# Patient Record
Sex: MALE | Race: WHITE | URBAN - METROPOLITAN AREA
[De-identification: names, ages, dates, MRNs, and addresses within clinical notes are randomized per-mention and may not be internally consistent; named-entity substitution may affect disease eponyms.]

---

## 2018-12-11 LAB
AVERAGE GLUCOSE: NORMAL
CHOLESTEROL, TOTAL: 205 MG/DL
CHOLESTEROL/HDL RATIO: 3.2
CREATININE: 0.8 MG/DL
HBA1C MFR BLD: 5.4 %
HDLC SERPL-MCNC: 65 MG/DL (ref 35–70)
LDL CHOLESTEROL CALCULATED: 122 MG/DL (ref 0–160)
POTASSIUM (K+): 4.2
TRIGL SERPL-MCNC: 85 MG/DL
VLDLC SERPL CALC-MCNC: NORMAL MG/DL

## 2019-04-09 VITALS
HEIGHT: 68 IN | SYSTOLIC BLOOD PRESSURE: 120 MMHG | DIASTOLIC BLOOD PRESSURE: 70 MMHG | WEIGHT: 196 LBS | TEMPERATURE: 97.1 F | BODY MASS INDEX: 29.7 KG/M2

## 2019-04-09 RX ORDER — SILDENAFIL CITRATE 20 MG/1
20 TABLET ORAL PRN
COMMUNITY
End: 2019-05-15 | Stop reason: SDUPTHER

## 2019-04-09 RX ORDER — SILDENAFIL 100 MG/1
100 TABLET, FILM COATED ORAL PRN
COMMUNITY
End: 2019-05-15 | Stop reason: SDUPTHER

## 2019-05-09 LAB
ALBUMIN SERPL-MCNC: 4.4 G/DL
ALP BLD-CCNC: 49 U/L
ALT SERPL-CCNC: 29 U/L
ANION GAP SERPL CALCULATED.3IONS-SCNC: NORMAL MMOL/L
AST SERPL-CCNC: 23 U/L
AVERAGE GLUCOSE: NORMAL
BILIRUB SERPL-MCNC: 0.9 MG/DL (ref 0.1–1.4)
BUN BLDV-MCNC: 19 MG/DL
CALCIUM SERPL-MCNC: 9.3 MG/DL
CHLORIDE BLD-SCNC: 105 MMOL/L
CHOLESTEROL, TOTAL: 208 MG/DL
CHOLESTEROL/HDL RATIO: 3.5
CO2: 25 MMOL/L
CREAT SERPL-MCNC: 0.84 MG/DL
GFR CALCULATED: NORMAL
GLUCOSE BLD-MCNC: 143 MG/DL
HBA1C MFR BLD: 5.9 %
HDLC SERPL-MCNC: 60 MG/DL (ref 35–70)
LDL CHOLESTEROL CALCULATED: 124 MG/DL (ref 0–160)
POTASSIUM SERPL-SCNC: 4.1 MMOL/L
SODIUM BLD-SCNC: 141 MMOL/L
TOTAL PROTEIN: 6.7
TRIGL SERPL-MCNC: 125 MG/DL
VLDLC SERPL CALC-MCNC: 149 MG/DL

## 2019-05-15 ENCOUNTER — OFFICE VISIT (OUTPATIENT)
Dept: PRIMARY CARE CLINIC | Age: 74
End: 2019-05-15
Payer: MEDICARE

## 2019-05-15 VITALS
WEIGHT: 200 LBS | HEART RATE: 86 BPM | OXYGEN SATURATION: 95 % | SYSTOLIC BLOOD PRESSURE: 120 MMHG | BODY MASS INDEX: 30.41 KG/M2 | TEMPERATURE: 97.2 F | DIASTOLIC BLOOD PRESSURE: 76 MMHG

## 2019-05-15 DIAGNOSIS — L30.9 ECZEMA, UNSPECIFIED TYPE: ICD-10-CM

## 2019-05-15 DIAGNOSIS — E78.2 MIXED HYPERLIPIDEMIA: Primary | ICD-10-CM

## 2019-05-15 DIAGNOSIS — K21.9 GASTROESOPHAGEAL REFLUX DISEASE WITHOUT ESOPHAGITIS: ICD-10-CM

## 2019-05-15 DIAGNOSIS — N52.9 ERECTILE DYSFUNCTION, UNSPECIFIED ERECTILE DYSFUNCTION TYPE: ICD-10-CM

## 2019-05-15 DIAGNOSIS — J30.1 SEASONAL ALLERGIC RHINITIS DUE TO POLLEN: ICD-10-CM

## 2019-05-15 DIAGNOSIS — R73.01 IMPAIRED FASTING GLUCOSE: ICD-10-CM

## 2019-05-15 PROCEDURE — 99214 OFFICE O/P EST MOD 30 MIN: CPT | Performed by: FAMILY MEDICINE

## 2019-05-15 RX ORDER — SILDENAFIL CITRATE 20 MG/1
20 TABLET ORAL DAILY PRN
Qty: 30 TABLET | Refills: 3 | Status: SHIPPED | OUTPATIENT
Start: 2019-05-15

## 2019-05-15 RX ORDER — PRAVASTATIN SODIUM 20 MG
20 TABLET ORAL DAILY
Qty: 90 TABLET | Refills: 3 | Status: SHIPPED | OUTPATIENT
Start: 2019-05-15 | End: 2019-12-09

## 2019-05-15 RX ORDER — MONTELUKAST SODIUM 10 MG/1
10 TABLET ORAL NIGHTLY
Qty: 90 TABLET | Refills: 3 | Status: SHIPPED
Start: 2019-05-15 | End: 2020-05-18

## 2019-05-15 RX ORDER — CLOBETASOL PROPIONATE 0.05 MG/G
60 GEL TOPICAL 2 TIMES DAILY
Qty: 60 G | Refills: 1 | Status: SHIPPED | OUTPATIENT
Start: 2019-05-15 | End: 2020-01-04

## 2019-05-15 RX ORDER — FAMOTIDINE 20 MG/1
20 TABLET, FILM COATED ORAL DAILY
Qty: 90 TABLET | Refills: 3 | Status: SHIPPED | OUTPATIENT
Start: 2019-05-15 | End: 2019-05-20 | Stop reason: SDUPTHER

## 2019-05-15 RX ORDER — PRAVASTATIN SODIUM 20 MG
20 TABLET ORAL DAILY
COMMUNITY
End: 2019-05-15 | Stop reason: SDUPTHER

## 2019-05-15 ASSESSMENT — PATIENT HEALTH QUESTIONNAIRE - PHQ9
2. FEELING DOWN, DEPRESSED OR HOPELESS: 0
SUM OF ALL RESPONSES TO PHQ QUESTIONS 1-9: 0
SUM OF ALL RESPONSES TO PHQ QUESTIONS 1-9: 0
SUM OF ALL RESPONSES TO PHQ9 QUESTIONS 1 & 2: 0
1. LITTLE INTEREST OR PLEASURE IN DOING THINGS: 0

## 2019-05-15 ASSESSMENT — ENCOUNTER SYMPTOMS
RESPIRATORY NEGATIVE: 1
ALLERGIC/IMMUNOLOGIC NEGATIVE: 1
GASTROINTESTINAL NEGATIVE: 1
EYES NEGATIVE: 1

## 2019-05-15 NOTE — PROGRESS NOTES
5/15/19     Gayle Lizarraga    : 1945 Sex: male   Age: 72 y.o. Chief Complaint   Patient presents with    Discuss Labs    Hyperlipidemia       Prior to Admission medications    Medication Sig Start Date End Date Taking? Authorizing Provider   famotidine (PEPCID) 20 MG tablet Take 1 tablet by mouth daily 5/15/19  Yes Ayde Fernandez DO   montelukast (SINGULAIR) 10 MG tablet Take 1 tablet by mouth nightly 5/15/19  Yes Ayde Fernandez DO   pravastatin (PRAVACHOL) 20 MG tablet Take 1 tablet by mouth daily 5/15/19  Yes Ayde Fernandez DO   clobetasol propionate 0.05 % GEL gel Apply 60 g topically 2 times daily 5/15/19  Yes Ayde Fernandez DO   sildenafil (REVATIO) 20 MG tablet Take 1 tablet by mouth daily as needed (as needed) 5/15/19  Yes Ayde Fernandez DO   fluticasone (FLONASE) 50 MCG/ACT nasal spray 2 sprays by Nasal route daily    Yes Historical Provider, MD   Coenzyme Q10 (CO Q-10) 200 MG CAPS Take 200 mg by mouth daily    Yes Historical Provider, MD   L-Lysine HCl 1000 MG TABS Take 1,000 mg by mouth daily    Yes Historical Provider, MD   Multiple Vitamins-Minerals (THERAPEUTIC MULTIVITAMIN-MINERALS) tablet Take 1 tablet by mouth daily   Yes Historical Provider, MD   Ascorbic Acid (VITAMIN C) 250 MG tablet Take 500 mg by mouth daily   Yes Historical Provider, MD   Saw [place], Serenoa repens, 1000 MG CAPS Take 1,000 mg by mouth daily    Yes Historical Provider, MD          HPI:  Patient presents today and medical follow-up on hyperlipidemia and impaired fasting glucose erectile dysfunction reflux disease seasonal allergies mild eczema. All stable at this time. Recent labs completed stable A1c at 5.9. Cholesterol 208 HDL 60 . ROS:  Review of Systems   Constitutional: Negative. HENT: Negative. Eyes: Negative. Respiratory: Negative. Gastrointestinal: Negative. Endocrine: Negative. Genitourinary: Negative. Musculoskeletal: Negative. Skin: Negative. Allergic/Immunologic: Negative. Neurological: Negative. Hematological: Negative. Psychiatric/Behavioral: Negative.                Current Outpatient Medications:     famotidine (PEPCID) 20 MG tablet, Take 1 tablet by mouth daily, Disp: 90 tablet, Rfl: 3    montelukast (SINGULAIR) 10 MG tablet, Take 1 tablet by mouth nightly, Disp: 90 tablet, Rfl: 3    pravastatin (PRAVACHOL) 20 MG tablet, Take 1 tablet by mouth daily, Disp: 90 tablet, Rfl: 3    clobetasol propionate 0.05 % GEL gel, Apply 60 g topically 2 times daily, Disp: 60 g, Rfl: 1    sildenafil (REVATIO) 20 MG tablet, Take 1 tablet by mouth daily as needed (as needed), Disp: 30 tablet, Rfl: 3    fluticasone (FLONASE) 50 MCG/ACT nasal spray, 2 sprays by Nasal route daily , Disp: , Rfl:     Coenzyme Q10 (CO Q-10) 200 MG CAPS, Take 200 mg by mouth daily , Disp: , Rfl:     L-Lysine HCl 1000 MG TABS, Take 1,000 mg by mouth daily , Disp: , Rfl:     Multiple Vitamins-Minerals (THERAPEUTIC MULTIVITAMIN-MINERALS) tablet, Take 1 tablet by mouth daily, Disp: , Rfl:     Ascorbic Acid (VITAMIN C) 250 MG tablet, Take 500 mg by mouth daily, Disp: , Rfl:     Saw [place], Serenoa repens, 1000 MG CAPS, Take 1,000 mg by mouth daily , Disp: , Rfl:     Allergies   Allergen Reactions    Atorvastatin     Flomax [Tamsulosin Hcl]     Penicillins     Silodosin        Social History     Tobacco Use    Smoking status: Never Smoker    Smokeless tobacco: Never Used   Substance Use Topics    Alcohol use: Yes     Comment: social    Drug use: No      Past Surgical History:   Procedure Laterality Date    KNEE SURGERY Right ~6/2015    TENDON RELEASE  2005    right hand     Family History   Problem Relation Age of Onset    Diabetes Mother     Heart Disease Mother     Stroke Mother     Other Brother      Past Medical History:   Diagnosis Date    Hyperlipidemia     Pleurisy     Vertigo        Vitals:    05/15/19 1128   BP: 120/76   Pulse: 86   Temp: 97.2 °F (36.2 °C)   TempSrc: Temporal   SpO2: 95%   Weight: 200 lb (90.7 kg)     BP Readings from Last 3 Encounters:   05/15/19 120/76   02/11/19 120/70   09/21/16 137/75        Physical Exam   Constitutional: He is oriented to person, place, and time. He appears well-developed and well-nourished. HENT:   Head: Normocephalic. Right Ear: External ear normal.   Left Ear: External ear normal.   Nose: Nose normal.   Mouth/Throat: Oropharynx is clear and moist.   Eyes: Pupils are equal, round, and reactive to light. Conjunctivae and EOM are normal.   Neck: Normal range of motion. Neck supple. Cardiovascular: Normal rate and intact distal pulses. Pulmonary/Chest: Breath sounds normal.   Abdominal: Soft. Bowel sounds are normal.   Musculoskeletal: Normal range of motion. Neurological: He is alert and oriented to person, place, and time. Skin: Skin is warm and dry. Psychiatric: He has a normal mood and affect. His behavior is normal.   Nursing note and vitals reviewed. Physical exam findings are all stable as noted. Mild eczema involving low back. Refilled prescription for clobetasol cream previously prescribed by Dr. Halina Galdamez. Refill provided on sildenafil. Labs to be completed prior to next visit in 6 months complete physical.                   Plan Per Assessment:  Pat Beasley was seen today for discuss labs and hyperlipidemia. Diagnoses and all orders for this visit:    Mixed hyperlipidemia  -     CBC Auto Differential; Future  -     Comprehensive Metabolic Panel; Future  -     Lipid Panel; Future  -     T4; Future  -     TSH without Reflex; Future  -     Psa screening; Future    Impaired fasting glucose  -     CBC Auto Differential; Future  -     Comprehensive Metabolic Panel; Future  -     Lipid Panel; Future  -     T4; Future  -     TSH without Reflex; Future  -     Psa screening;  Future    Seasonal allergic rhinitis due to pollen    Erectile dysfunction, unspecified erectile dysfunction type  -     CBC Auto Differential; Future  -     Comprehensive Metabolic Panel; Future  -     Lipid Panel; Future  -     T4; Future  -     TSH without Reflex; Future  -     Psa screening; Future    Gastroesophageal reflux disease without esophagitis  -     CBC Auto Differential; Future  -     Comprehensive Metabolic Panel; Future  -     Lipid Panel; Future  -     T4; Future  -     TSH without Reflex; Future  -     Psa screening; Future    Eczema, unspecified type    Other orders  -     famotidine (PEPCID) 20 MG tablet; Take 1 tablet by mouth daily  -     montelukast (SINGULAIR) 10 MG tablet; Take 1 tablet by mouth nightly  -     pravastatin (PRAVACHOL) 20 MG tablet; Take 1 tablet by mouth daily  -     clobetasol propionate 0.05 % GEL gel; Apply 60 g topically 2 times daily  -     sildenafil (REVATIO) 20 MG tablet; Take 1 tablet by mouth daily as needed (as needed)            Return in about 6 months (around 11/15/2019) for carlos Badillo DO    Note was generated with the assistance of voice recognition software. Document was reviewed however may contain grammatical errors.

## 2019-05-21 RX ORDER — FAMOTIDINE 20 MG/1
TABLET, FILM COATED ORAL
Qty: 90 TABLET | Refills: 3 | Status: SHIPPED
Start: 2019-05-21 | End: 2020-05-29 | Stop reason: SDUPTHER

## 2019-11-06 RX ORDER — VITAMIN E 268 MG
400 CAPSULE ORAL DAILY
COMMUNITY

## 2019-11-06 RX ORDER — PRAVASTATIN SODIUM 40 MG
40 TABLET ORAL DAILY
COMMUNITY
End: 2019-12-09 | Stop reason: SDUPTHER

## 2019-11-06 RX ORDER — SILDENAFIL CITRATE 20 MG/1
20 TABLET ORAL PRN
COMMUNITY
End: 2020-01-04

## 2019-11-06 RX ORDER — SILDENAFIL 100 MG/1
100 TABLET, FILM COATED ORAL PRN
COMMUNITY
End: 2022-03-10

## 2019-12-02 LAB
ALBUMIN SERPL-MCNC: 4.6 G/DL
ALP BLD-CCNC: 50 U/L
ALT SERPL-CCNC: 38 U/L
ANION GAP SERPL CALCULATED.3IONS-SCNC: NORMAL MMOL/L
AST SERPL-CCNC: 28 U/L
BASOPHILS ABSOLUTE: 42 /ΜL
BASOPHILS RELATIVE PERCENT: 0.8 %
BILIRUB SERPL-MCNC: 0.8 MG/DL (ref 0.1–1.4)
BUN BLDV-MCNC: 15 MG/DL
CALCIUM SERPL-MCNC: 9.5 MG/DL
CHLORIDE BLD-SCNC: 102 MMOL/L
CHOLESTEROL, TOTAL: 239 MG/DL
CHOLESTEROL/HDL RATIO: 4.3
CO2: 25 MMOL/L
CREAT SERPL-MCNC: 0.86 MG/DL
EOSINOPHILS ABSOLUTE: 159 /ΜL
EOSINOPHILS RELATIVE PERCENT: 3 %
GFR CALCULATED: NORMAL
GLUCOSE BLD-MCNC: 127 MG/DL
HCT VFR BLD CALC: 44.3 % (ref 41–53)
HDLC SERPL-MCNC: 55 MG/DL (ref 35–70)
HEMOGLOBIN: 15.7 G/DL (ref 13.5–17.5)
LDL CHOLESTEROL CALCULATED: 152 MG/DL (ref 0–160)
LYMPHOCYTES ABSOLUTE: 2184 /ΜL
LYMPHOCYTES RELATIVE PERCENT: 41.2 %
MCH RBC QN AUTO: 32.8 PG
MCHC RBC AUTO-ENTMCNC: 35.4 G/DL
MCV RBC AUTO: 92.7 FL
MONOCYTES ABSOLUTE: 790 /ΜL
MONOCYTES RELATIVE PERCENT: 14.9 %
NEUTROPHILS ABSOLUTE: 2125 /ΜL
NEUTROPHILS RELATIVE PERCENT: 40.1 %
PDW BLD-RTO: 11.9 %
PLATELET # BLD: 155 K/ΜL
PMV BLD AUTO: 10.8 FL
POTASSIUM SERPL-SCNC: 3.8 MMOL/L
PROSTATE SPECIFIC ANTIGEN: 0.8 NG/ML
RBC # BLD: 4.78 10^6/ΜL
SODIUM BLD-SCNC: 139 MMOL/L
T4 TOTAL: 6.8
TOTAL PROTEIN: 6.9
TRIGL SERPL-MCNC: 182 MG/DL
TSH SERPL DL<=0.05 MIU/L-ACNC: 2.08 UIU/ML
VLDLC SERPL CALC-MCNC: NORMAL MG/DL
WBC # BLD: 5.3 10^3/ML

## 2019-12-03 DIAGNOSIS — N52.9 ERECTILE DYSFUNCTION, UNSPECIFIED ERECTILE DYSFUNCTION TYPE: ICD-10-CM

## 2019-12-03 DIAGNOSIS — R73.01 IMPAIRED FASTING GLUCOSE: ICD-10-CM

## 2019-12-03 DIAGNOSIS — K21.9 GASTROESOPHAGEAL REFLUX DISEASE WITHOUT ESOPHAGITIS: ICD-10-CM

## 2019-12-03 DIAGNOSIS — E78.2 MIXED HYPERLIPIDEMIA: ICD-10-CM

## 2019-12-09 ENCOUNTER — OFFICE VISIT (OUTPATIENT)
Dept: PRIMARY CARE CLINIC | Age: 74
End: 2019-12-09
Payer: MEDICARE

## 2019-12-09 VITALS
SYSTOLIC BLOOD PRESSURE: 124 MMHG | WEIGHT: 208.6 LBS | OXYGEN SATURATION: 95 % | HEART RATE: 81 BPM | RESPIRATION RATE: 16 BRPM | TEMPERATURE: 98 F | DIASTOLIC BLOOD PRESSURE: 62 MMHG | BODY MASS INDEX: 31.72 KG/M2

## 2019-12-09 DIAGNOSIS — D22.9 BENIGN SKIN MOLE: ICD-10-CM

## 2019-12-09 DIAGNOSIS — E78.2 MIXED HYPERLIPIDEMIA: Chronic | ICD-10-CM

## 2019-12-09 DIAGNOSIS — Z00.00 ANNUAL PHYSICAL EXAM: Primary | ICD-10-CM

## 2019-12-09 DIAGNOSIS — R07.0 THROAT PAIN: ICD-10-CM

## 2019-12-09 DIAGNOSIS — R31.1 BENIGN ESSENTIAL MICROSCOPIC HEMATURIA: ICD-10-CM

## 2019-12-09 DIAGNOSIS — G47.33 OSA (OBSTRUCTIVE SLEEP APNEA): ICD-10-CM

## 2019-12-09 LAB
BILIRUBIN, POC: NORMAL
BLOOD URINE, POC: NORMAL
CLARITY, POC: CLEAR
COLOR, POC: YELLOW
GLUCOSE URINE, POC: NORMAL
KETONES, POC: NORMAL
LEUKOCYTE EST, POC: NORMAL
NITRITE, POC: NORMAL
PH, POC: 7
PROTEIN, POC: NORMAL
SPECIFIC GRAVITY, POC: 1.01
UROBILINOGEN, POC: 0.2

## 2019-12-09 PROCEDURE — 99397 PER PM REEVAL EST PAT 65+ YR: CPT | Performed by: FAMILY MEDICINE

## 2019-12-09 PROCEDURE — 93000 ELECTROCARDIOGRAM COMPLETE: CPT | Performed by: FAMILY MEDICINE

## 2019-12-09 RX ORDER — PRAVASTATIN SODIUM 40 MG
40 TABLET ORAL DAILY
Qty: 90 TABLET | Refills: 3 | Status: SHIPPED
Start: 2019-12-09 | End: 2020-06-15 | Stop reason: SDUPTHER

## 2019-12-09 RX ORDER — CLOBETASOL PROPIONATE 0.05 G/100ML
SHAMPOO TOPICAL
Qty: 118 ML | Refills: 1 | Status: SHIPPED | OUTPATIENT
Start: 2019-12-09

## 2019-12-09 RX ORDER — SCOLOPAMINE TRANSDERMAL SYSTEM 1 MG/1
1 PATCH, EXTENDED RELEASE TRANSDERMAL
Qty: 10 PATCH | Refills: 11 | Status: SHIPPED
Start: 2019-12-09 | End: 2020-06-15 | Stop reason: CLARIF

## 2019-12-09 ASSESSMENT — ENCOUNTER SYMPTOMS
EYES NEGATIVE: 1
RESPIRATORY NEGATIVE: 1
GASTROINTESTINAL NEGATIVE: 1
ALLERGIC/IMMUNOLOGIC NEGATIVE: 1

## 2019-12-20 ENCOUNTER — OFFICE VISIT (OUTPATIENT)
Dept: FAMILY MEDICINE CLINIC | Age: 74
End: 2019-12-20
Payer: MEDICARE

## 2019-12-20 VITALS
TEMPERATURE: 97.4 F | DIASTOLIC BLOOD PRESSURE: 68 MMHG | HEART RATE: 93 BPM | OXYGEN SATURATION: 96 % | SYSTOLIC BLOOD PRESSURE: 124 MMHG

## 2019-12-20 DIAGNOSIS — R09.82 POSTNASAL DRIP: ICD-10-CM

## 2019-12-20 DIAGNOSIS — R07.0 PAIN IN THROAT: ICD-10-CM

## 2019-12-20 DIAGNOSIS — J01.90 ACUTE NON-RECURRENT SINUSITIS, UNSPECIFIED LOCATION: Primary | ICD-10-CM

## 2019-12-20 DIAGNOSIS — R51.9 SINUS HEADACHE: ICD-10-CM

## 2019-12-20 PROCEDURE — 99213 OFFICE O/P EST LOW 20 MIN: CPT | Performed by: PHYSICIAN ASSISTANT

## 2019-12-20 RX ORDER — DOXYCYCLINE HYCLATE 100 MG
100 TABLET ORAL 2 TIMES DAILY
Qty: 20 TABLET | Refills: 0 | Status: SHIPPED | OUTPATIENT
Start: 2019-12-20 | End: 2019-12-30

## 2020-01-04 ENCOUNTER — OFFICE VISIT (OUTPATIENT)
Dept: FAMILY MEDICINE CLINIC | Age: 75
End: 2020-01-04
Payer: MEDICARE

## 2020-01-04 VITALS
HEART RATE: 72 BPM | HEIGHT: 69 IN | BODY MASS INDEX: 30.66 KG/M2 | DIASTOLIC BLOOD PRESSURE: 84 MMHG | OXYGEN SATURATION: 95 % | TEMPERATURE: 97.2 F | RESPIRATION RATE: 18 BRPM | WEIGHT: 207 LBS | SYSTOLIC BLOOD PRESSURE: 120 MMHG

## 2020-01-04 PROBLEM — J02.9 SORE THROAT: Status: ACTIVE | Noted: 2019-12-09

## 2020-01-04 LAB — S PYO AG THROAT QL: NORMAL

## 2020-01-04 PROCEDURE — 87880 STREP A ASSAY W/OPTIC: CPT | Performed by: FAMILY MEDICINE

## 2020-01-04 PROCEDURE — 99213 OFFICE O/P EST LOW 20 MIN: CPT | Performed by: FAMILY MEDICINE

## 2020-01-04 RX ORDER — AZITHROMYCIN 250 MG/1
250 TABLET, FILM COATED ORAL SEE ADMIN INSTRUCTIONS
Qty: 6 TABLET | Refills: 0 | Status: SHIPPED | OUTPATIENT
Start: 2020-01-04 | End: 2020-01-09

## 2020-01-04 RX ORDER — LIDOCAINE HYDROCHLORIDE 20 MG/ML
15 SOLUTION OROPHARYNGEAL
Qty: 100 ML | Refills: 0 | Status: SHIPPED
Start: 2020-01-04 | End: 2020-06-15 | Stop reason: CLARIF

## 2020-01-04 RX ORDER — PREDNISONE 10 MG/1
10 TABLET ORAL 2 TIMES DAILY
Qty: 10 TABLET | Refills: 0 | Status: SHIPPED | OUTPATIENT
Start: 2020-01-04 | End: 2020-01-09

## 2020-01-04 ASSESSMENT — ENCOUNTER SYMPTOMS
SINUS COMPLAINT: 1
SINUS PRESSURE: 1
SORE THROAT: 1
EYES NEGATIVE: 1
COUGH: 1
GASTROINTESTINAL NEGATIVE: 1
SWOLLEN GLANDS: 1

## 2020-01-04 NOTE — PROGRESS NOTES
MG tablet Take 1 tablet by mouth daily Take 1 by mouth daily with evening meal Yes Hector Fernandez DO   scopolamine (TRANSDERM-SCOP, 1.5 MG,) transdermal patch Place 1 patch onto the skin every 72 hours Yes Hector Fernandez DO   Clobetasol Propionate 0.05 % SHAM Qd prn Yes Hector Fernandez DO   sildenafil (VIAGRA) 100 MG tablet Take 100 mg by mouth as needed for Erectile Dysfunction (Take 1 everyday by mouth as needed) Yes Historical Provider, MD   vitamin E 400 UNIT capsule Take 400 Units by mouth daily Yes Historical Provider, MD   aspirin 81 MG tablet Take 81 mg by mouth daily Yes Historical Provider, MD   famotidine (PEPCID) 20 MG tablet TAKE 1 TABLET DAILY Yes Hector Fernandez DO   montelukast (SINGULAIR) 10 MG tablet Take 1 tablet by mouth nightly Yes Hector Fernandez DO   sildenafil (REVATIO) 20 MG tablet Take 1 tablet by mouth daily as needed (as needed) Yes Hector Fernandez DO   fluticasone (FLONASE) 50 MCG/ACT nasal spray 2 sprays by Nasal route daily  Yes Historical Provider, MD   Coenzyme Q10 (CO Q-10) 200 MG CAPS Take 200 mg by mouth daily  Yes Historical Provider, MD   L-Lysine HCl 1000 MG TABS Take 1,000 mg by mouth daily  Yes Historical Provider, MD   Multiple Vitamins-Minerals (THERAPEUTIC MULTIVITAMIN-MINERALS) tablet Take 1 tablet by mouth daily Yes Historical Provider, MD   Ascorbic Acid (VITAMIN C) 250 MG tablet Take 500 mg by mouth daily Yes Historical Provider, MD   Saw [place], Serenoa repens, 1000 MG CAPS Take 1,000 mg by mouth daily  Yes Historical Provider, MD        Social History     Tobacco Use    Smoking status: Never Smoker    Smokeless tobacco: Never Used   Substance Use Topics    Alcohol use: Yes     Comment: social        Vitals:    01/04/20 1320   BP: 120/84   Pulse: 72   Resp: 18   Temp: 97.2 °F (36.2 °C)   SpO2: 95%   Weight: 207 lb (93.9 kg)   Height: 5' 8.5\" (1.74 m)     Estimated body mass index is 31.02 kg/m² as calculated from the following:    Height as of

## 2020-01-08 PROBLEM — Z00.00 ANNUAL PHYSICAL EXAM: Status: RESOLVED | Noted: 2019-12-09 | Resolved: 2020-01-08

## 2020-02-03 PROBLEM — J02.9 SORE THROAT: Status: RESOLVED | Noted: 2019-12-09 | Resolved: 2020-02-03

## 2020-05-18 RX ORDER — MONTELUKAST SODIUM 10 MG/1
TABLET ORAL
Qty: 90 TABLET | Refills: 3 | Status: SHIPPED
Start: 2020-05-18 | End: 2021-05-12

## 2020-05-29 RX ORDER — FAMOTIDINE 20 MG/1
TABLET, FILM COATED ORAL
Qty: 90 TABLET | Refills: 3 | Status: SHIPPED
Start: 2020-05-29 | End: 2020-06-01

## 2020-06-01 RX ORDER — FAMOTIDINE 20 MG/1
TABLET, FILM COATED ORAL
Qty: 90 TABLET | Refills: 3 | Status: SHIPPED
Start: 2020-06-01 | End: 2021-05-27

## 2020-06-05 LAB
ALBUMIN SERPL-MCNC: NORMAL G/DL
ALP BLD-CCNC: NORMAL U/L
ALT SERPL-CCNC: NORMAL U/L
ANION GAP SERPL CALCULATED.3IONS-SCNC: NORMAL MMOL/L
AST SERPL-CCNC: NORMAL U/L
BILIRUB SERPL-MCNC: NORMAL MG/DL
BUN BLDV-MCNC: 15 MG/DL
CALCIUM SERPL-MCNC: NORMAL MG/DL
CHLORIDE BLD-SCNC: NORMAL MMOL/L
CHOLESTEROL, TOTAL: 248 MG/DL
CHOLESTEROL/HDL RATIO: 4.5
CO2: NORMAL
CREAT SERPL-MCNC: 0.77 MG/DL
GFR CALCULATED: NORMAL
GLUCOSE BLD-MCNC: 145 MG/DL
HDLC SERPL-MCNC: 55 MG/DL (ref 35–70)
LDL CHOLESTEROL CALCULATED: 162 MG/DL (ref 0–160)
POTASSIUM SERPL-SCNC: 4.3 MMOL/L
SODIUM BLD-SCNC: NORMAL MMOL/L
TOTAL CK: 49 U/L
TOTAL PROTEIN: NORMAL
TRIGL SERPL-MCNC: 160 MG/DL
VLDLC SERPL CALC-MCNC: ABNORMAL MG/DL

## 2020-06-15 ENCOUNTER — OFFICE VISIT (OUTPATIENT)
Dept: PRIMARY CARE CLINIC | Age: 75
End: 2020-06-15
Payer: MEDICARE

## 2020-06-15 VITALS
DIASTOLIC BLOOD PRESSURE: 76 MMHG | BODY MASS INDEX: 30.57 KG/M2 | SYSTOLIC BLOOD PRESSURE: 140 MMHG | HEART RATE: 90 BPM | OXYGEN SATURATION: 96 % | WEIGHT: 204 LBS

## 2020-06-15 PROBLEM — M79.18 RHOMBOID MUSCLE PAIN: Status: ACTIVE | Noted: 2020-06-15

## 2020-06-15 PROBLEM — T14.8XXA ABRASION: Status: ACTIVE | Noted: 2020-06-15

## 2020-06-15 PROCEDURE — 99214 OFFICE O/P EST MOD 30 MIN: CPT | Performed by: FAMILY MEDICINE

## 2020-06-15 RX ORDER — PRAVASTATIN SODIUM 40 MG
20 TABLET ORAL DAILY
Qty: 90 TABLET | Refills: 3 | Status: SHIPPED
Start: 2020-06-15 | End: 2020-06-15

## 2020-06-15 RX ORDER — FLUTICASONE PROPIONATE 50 MCG
2 SPRAY, SUSPENSION (ML) NASAL DAILY
Qty: 3 BOTTLE | Refills: 3 | Status: SHIPPED
Start: 2020-06-15 | End: 2021-05-06

## 2020-06-15 RX ORDER — PRAVASTATIN SODIUM 40 MG
TABLET ORAL
Qty: 90 TABLET | Refills: 3 | Status: SHIPPED
Start: 2020-06-15 | End: 2021-06-04 | Stop reason: SDUPTHER

## 2020-06-15 ASSESSMENT — PATIENT HEALTH QUESTIONNAIRE - PHQ9
SUM OF ALL RESPONSES TO PHQ QUESTIONS 1-9: 0
SUM OF ALL RESPONSES TO PHQ QUESTIONS 1-9: 0
SUM OF ALL RESPONSES TO PHQ9 QUESTIONS 1 & 2: 0
2. FEELING DOWN, DEPRESSED OR HOPELESS: 0
1. LITTLE INTEREST OR PLEASURE IN DOING THINGS: 0

## 2020-06-15 ASSESSMENT — ENCOUNTER SYMPTOMS
BACK PAIN: 1
EYES NEGATIVE: 1
ALLERGIC/IMMUNOLOGIC NEGATIVE: 1
GASTROINTESTINAL NEGATIVE: 1
RESPIRATORY NEGATIVE: 1

## 2020-06-15 NOTE — PROGRESS NOTES
6/15/20     Shira Espinosa    : 1945 Sex: male   Age: 69 y.o. Chief Complaint   Patient presents with    Discuss Labs    Hyperlipidemia    Back Pain     re injured back working in the yard       Prior to Admission medications    Medication Sig Start Date End Date Taking? Authorizing Provider   fluticasone (FLONASE) 50 MCG/ACT nasal spray 2 sprays by Nasal route daily 6/15/20  Yes Kathleen Fernandez, DO   mupirocin (BACTROBAN) 2 % ointment Apply 3 times daily. 6/15/20  Yes Kathleen Fernandez, DO   pravastatin (PRAVACHOL) 40 MG tablet 1 qd 6/15/20  Yes Kathleen Fernandez, DO   famotidine (PEPCID) 20 MG tablet TAKE 1 TABLET DAILY 20  Yes Kathleen Fernandez, DO   montelukast (SINGULAIR) 10 MG tablet TAKE 1 TABLET NIGHTLY 20  Yes Kathleen Fernandez, DO   Clobetasol Propionate 0.05 % SHAM Qd prn 19  Yes Kathleen Fernandez, DO   sildenafil (VIAGRA) 100 MG tablet Take 100 mg by mouth as needed for Erectile Dysfunction (Take 1 everyday by mouth as needed)   Yes Historical Provider, MD   vitamin E 400 UNIT capsule Take 400 Units by mouth daily   Yes Historical Provider, MD   aspirin 81 MG tablet Take 81 mg by mouth daily   Yes Historical Provider, MD   sildenafil (REVATIO) 20 MG tablet Take 1 tablet by mouth daily as needed (as needed) 5/15/19  Yes Kathleen Fernandez, DO   Coenzyme Q10 (CO Q-10) 200 MG CAPS Take 200 mg by mouth daily    Yes Historical Provider, MD   L-Lysine HCl 1000 MG TABS Take 1,000 mg by mouth daily    Yes Historical Provider, MD   Multiple Vitamins-Minerals (THERAPEUTIC MULTIVITAMIN-MINERALS) tablet Take 1 tablet by mouth daily   Yes Historical Provider, MD   Ascorbic Acid (VITAMIN C) 250 MG tablet Take 500 mg by mouth daily   Yes Historical Provider, MD   Saw [place], Serenoa repens, 1000 MG CAPS Take 1,000 mg by mouth daily    Yes Historical Provider, MD          HPI: Patient evaluated today hyperlipidemia reflux disease impaired fasting glucose.   Medically managing fairly well with present medications. Rhomboid spasm on the right encourage moist heat ibuprofen and if persistent to follow-up. Issues of skin sore left lower back. Recommended some Bactroban ointment and if persistent reassess. Review of Systems   Constitutional: Negative. HENT: Negative. Eyes: Negative. Respiratory: Negative. Gastrointestinal: Negative. Endocrine: Negative. Genitourinary: Negative. Musculoskeletal: Positive for back pain and myalgias. Skin: Negative. Allergic/Immunologic: Negative. Neurological: Negative. Hematological: Negative. Psychiatric/Behavioral: Negative. Systems review overall stable aside from muscular discomfort right rhomboid. Current Outpatient Medications:     fluticasone (FLONASE) 50 MCG/ACT nasal spray, 2 sprays by Nasal route daily, Disp: 3 Bottle, Rfl: 3    mupirocin (BACTROBAN) 2 % ointment, Apply 3 times daily. , Disp: 30 g, Rfl: 0    pravastatin (PRAVACHOL) 40 MG tablet, 1 qd, Disp: 90 tablet, Rfl: 3    famotidine (PEPCID) 20 MG tablet, TAKE 1 TABLET DAILY, Disp: 90 tablet, Rfl: 3    montelukast (SINGULAIR) 10 MG tablet, TAKE 1 TABLET NIGHTLY, Disp: 90 tablet, Rfl: 3    Clobetasol Propionate 0.05 % SHAM, Qd prn, Disp: 118 mL, Rfl: 1    sildenafil (VIAGRA) 100 MG tablet, Take 100 mg by mouth as needed for Erectile Dysfunction (Take 1 everyday by mouth as needed), Disp: , Rfl:     vitamin E 400 UNIT capsule, Take 400 Units by mouth daily, Disp: , Rfl:     aspirin 81 MG tablet, Take 81 mg by mouth daily, Disp: , Rfl:     sildenafil (REVATIO) 20 MG tablet, Take 1 tablet by mouth daily as needed (as needed), Disp: 30 tablet, Rfl: 3    Coenzyme Q10 (CO Q-10) 200 MG CAPS, Take 200 mg by mouth daily , Disp: , Rfl:     L-Lysine HCl 1000 MG TABS, Take 1,000 mg by mouth daily , Disp: , Rfl:     Multiple Vitamins-Minerals (THERAPEUTIC MULTIVITAMIN-MINERALS) tablet, Take 1 tablet by mouth daily, Disp: , Rfl:     Ascorbic Acid (VITAMIN C) 250 MG tablet, Take 500 mg by mouth daily, Disp: , Rfl:     Saw [place], Serenoa repens, 1000 MG CAPS, Take 1,000 mg by mouth daily , Disp: , Rfl:     Allergies   Allergen Reactions    Atorvastatin     Flomax [Tamsulosin Hcl]     Penicillins     Silodosin        Social History     Tobacco Use    Smoking status: Never Smoker    Smokeless tobacco: Never Used   Substance Use Topics    Alcohol use: Yes     Comment: social    Drug use: No      Past Surgical History:   Procedure Laterality Date    COLONOSCOPY      KNEE SURGERY Right ~6/2015    TENDON RELEASE Right 2005    right hand    VARICOCELECTOMY  1980s    abd. Family History   Problem Relation Age of Onset    Diabetes Mother     Heart Disease Mother     Stroke Mother     Other Brother      Past Medical History:   Diagnosis Date    Hyperlipidemia     Pleurisy     Vertigo 06/2008       Vitals:    06/15/20 1032   BP: (!) 140/76   Pulse: 90   SpO2: 96%   Weight: 204 lb (92.5 kg)     BP Readings from Last 3 Encounters:   06/15/20 (!) 140/76   01/04/20 120/84   12/20/19 124/68        Physical Exam  Vitals signs and nursing note reviewed. Constitutional:       Appearance: He is well-developed. HENT:      Head: Normocephalic. Right Ear: External ear normal.      Left Ear: External ear normal.      Nose: Nose normal.   Eyes:      Conjunctiva/sclera: Conjunctivae normal.      Pupils: Pupils are equal, round, and reactive to light. Neck:      Musculoskeletal: Normal range of motion and neck supple. Cardiovascular:      Rate and Rhythm: Normal rate. Pulmonary:      Breath sounds: Normal breath sounds. Abdominal:      General: Bowel sounds are normal.      Palpations: Abdomen is soft. Musculoskeletal: Normal range of motion. Skin:     General: Skin is warm and dry. Neurological:      Mental Status: He is alert and oriented to person, place, and time.    Psychiatric:         Behavior: Behavior normal.        Today's vitals physical examination stable. Medications to continue as prescribed. Cholesterol 248 HDL 55  glucose at 145 with an A1c at 6.1. Recommended diet exercise current medications and care. Pravastatin adjusted to 40 mg a day. Continue encourage tighter control on sugars. Reassessment 6 months and sooner if problems. Plan Per Assessment:  Margaret Washington was seen today for discuss labs, hyperlipidemia and back pain. Diagnoses and all orders for this visit:    Mixed hyperlipidemia  -     Comprehensive Metabolic Panel; Future  -     Hemoglobin A1C; Future  -     CBC Auto Differential; Future  -     Lipid Panel; Future  -     T4; Future  -     TSH without Reflex; Future  -     Psa screening; Future    Gastroesophageal reflux disease without esophagitis  -     Comprehensive Metabolic Panel; Future  -     Hemoglobin A1C; Future  -     CBC Auto Differential; Future  -     Lipid Panel; Future  -     T4; Future  -     TSH without Reflex; Future  -     Psa screening; Future    Impaired fasting glucose  -     Comprehensive Metabolic Panel; Future  -     Hemoglobin A1C; Future  -     CBC Auto Differential; Future  -     Lipid Panel; Future  -     T4; Future  -     TSH without Reflex; Future  -     Psa screening; Future    Rhomboid muscle pain  -     Comprehensive Metabolic Panel; Future  -     Hemoglobin A1C; Future  -     CBC Auto Differential; Future  -     Lipid Panel; Future  -     T4; Future  -     TSH without Reflex; Future  -     Psa screening; Future    Abrasion    Other orders  -     Discontinue: pravastatin (PRAVACHOL) 40 MG tablet; Take 0.5 tablets by mouth daily Take 1 by mouth daily with evening meal  -     fluticasone (FLONASE) 50 MCG/ACT nasal spray; 2 sprays by Nasal route daily  -     mupirocin (BACTROBAN) 2 % ointment; Apply 3 times daily. -     pravastatin (PRAVACHOL) 40 MG tablet; 1 qd            No follow-ups on file.       Evan Ya DO    Note was generated with the assistance of

## 2021-05-06 RX ORDER — FLUTICASONE PROPIONATE 50 MCG
SPRAY, SUSPENSION (ML) NASAL
Qty: 48 G | Refills: 3 | Status: SHIPPED
Start: 2021-05-06 | End: 2022-03-10 | Stop reason: SDUPTHER

## 2021-05-12 RX ORDER — MONTELUKAST SODIUM 10 MG/1
TABLET ORAL
Qty: 90 TABLET | Refills: 3 | Status: SHIPPED
Start: 2021-05-12 | End: 2022-03-10 | Stop reason: SDUPTHER

## 2021-05-22 LAB
ALBUMIN SERPL-MCNC: 4.4 G/DL
ALP BLD-CCNC: 45 U/L
ALT SERPL-CCNC: 39 U/L
ANION GAP SERPL CALCULATED.3IONS-SCNC: NORMAL MMOL/L
AST SERPL-CCNC: 27 U/L
AVERAGE GLUCOSE: NORMAL
BASOPHILS ABSOLUTE: 42 /ΜL
BASOPHILS RELATIVE PERCENT: 0.8 %
BILIRUB SERPL-MCNC: 0.7 MG/DL (ref 0.1–1.4)
BUN BLDV-MCNC: 19 MG/DL
CALCIUM SERPL-MCNC: 9.1 MG/DL
CHLORIDE BLD-SCNC: 106 MMOL/L
CHOLESTEROL, TOTAL: 208 MG/DL
CHOLESTEROL/HDL RATIO: 3.5
CO2: 27 MMOL/L
CREAT SERPL-MCNC: 0.86 MG/DL
EOSINOPHILS ABSOLUTE: 182 /ΜL
EOSINOPHILS RELATIVE PERCENT: 3.5 %
GFR CALCULATED: 86
GLUCOSE BLD-MCNC: 162 MG/DL
HBA1C MFR BLD: 6.5 %
HCT VFR BLD CALC: 42 % (ref 41–53)
HDLC SERPL-MCNC: 59 MG/DL (ref 35–70)
HEMOGLOBIN: 14.8 G/DL (ref 13.5–17.5)
LDL CHOLESTEROL CALCULATED: 126 MG/DL (ref 0–160)
LYMPHOCYTES ABSOLUTE: 2361 /ΜL
LYMPHOCYTES RELATIVE PERCENT: 45.4 %
MCH RBC QN AUTO: 33.2 PG
MCHC RBC AUTO-ENTMCNC: 35.2 G/DL
MCV RBC AUTO: 94.2 FL
MONOCYTES ABSOLUTE: 712 /ΜL
MONOCYTES RELATIVE PERCENT: 13.7 %
NEUTROPHILS ABSOLUTE: 1903 /ΜL
NEUTROPHILS RELATIVE PERCENT: 36.6 %
NONHDLC SERPL-MCNC: 149 MG/DL
PDW BLD-RTO: 12.1 %
PLATELET # BLD: 135 K/ΜL
PMV BLD AUTO: 10.8 FL
POTASSIUM SERPL-SCNC: 4.6 MMOL/L
PROSTATE SPECIFIC ANTIGEN: 0.8 NG/ML
RBC # BLD: 4.46 10^6/ΜL
SODIUM BLD-SCNC: 141 MMOL/L
T4 TOTAL: 5.8
TOTAL PROTEIN: 6.7
TRIGL SERPL-MCNC: 123 MG/DL
TSH SERPL DL<=0.05 MIU/L-ACNC: 1.26 UIU/ML
VLDLC SERPL CALC-MCNC: NORMAL MG/DL
WBC # BLD: 5.2 10^3/ML

## 2021-05-25 DIAGNOSIS — K21.9 GASTROESOPHAGEAL REFLUX DISEASE WITHOUT ESOPHAGITIS: Chronic | ICD-10-CM

## 2021-05-25 DIAGNOSIS — R73.01 IMPAIRED FASTING GLUCOSE: ICD-10-CM

## 2021-05-25 DIAGNOSIS — E78.2 MIXED HYPERLIPIDEMIA: Chronic | ICD-10-CM

## 2021-05-25 DIAGNOSIS — M79.18 RHOMBOID MUSCLE PAIN: ICD-10-CM

## 2021-05-27 RX ORDER — FAMOTIDINE 20 MG/1
TABLET, FILM COATED ORAL
Qty: 90 TABLET | Refills: 3 | Status: SHIPPED
Start: 2021-05-27 | End: 2022-03-10 | Stop reason: SDUPTHER

## 2021-06-04 ENCOUNTER — OFFICE VISIT (OUTPATIENT)
Dept: PRIMARY CARE CLINIC | Age: 76
End: 2021-06-04
Payer: MEDICARE

## 2021-06-04 VITALS
OXYGEN SATURATION: 96 % | WEIGHT: 198 LBS | HEIGHT: 69 IN | SYSTOLIC BLOOD PRESSURE: 126 MMHG | BODY MASS INDEX: 29.33 KG/M2 | TEMPERATURE: 97.6 F | DIASTOLIC BLOOD PRESSURE: 84 MMHG | HEART RATE: 88 BPM

## 2021-06-04 DIAGNOSIS — E11.9 TYPE 2 DIABETES MELLITUS WITHOUT COMPLICATION, WITHOUT LONG-TERM CURRENT USE OF INSULIN (HCC): ICD-10-CM

## 2021-06-04 DIAGNOSIS — M54.50 CHRONIC MIDLINE LOW BACK PAIN WITHOUT SCIATICA: ICD-10-CM

## 2021-06-04 DIAGNOSIS — G47.33 OSA (OBSTRUCTIVE SLEEP APNEA): ICD-10-CM

## 2021-06-04 DIAGNOSIS — G89.29 CHRONIC MIDLINE LOW BACK PAIN WITHOUT SCIATICA: ICD-10-CM

## 2021-06-04 DIAGNOSIS — M54.9 DORSAL BACK PAIN: ICD-10-CM

## 2021-06-04 DIAGNOSIS — J30.1 SEASONAL ALLERGIC RHINITIS DUE TO POLLEN: ICD-10-CM

## 2021-06-04 DIAGNOSIS — Z00.00 ANNUAL PHYSICAL EXAM: Primary | ICD-10-CM

## 2021-06-04 DIAGNOSIS — E78.2 MIXED HYPERLIPIDEMIA: Chronic | ICD-10-CM

## 2021-06-04 LAB
BILIRUBIN, POC: NORMAL
BLOOD URINE, POC: NORMAL
CLARITY, POC: CLEAR
COLOR, POC: YELLOW
GLUCOSE URINE, POC: NORMAL
KETONES, POC: NORMAL
LEUKOCYTE EST, POC: NORMAL
NITRITE, POC: NORMAL
PH, POC: 5.5
PROTEIN, POC: NORMAL
SPECIFIC GRAVITY, POC: >1.03
UROBILINOGEN, POC: 0.2

## 2021-06-04 PROCEDURE — 81002 URINALYSIS NONAUTO W/O SCOPE: CPT | Performed by: FAMILY MEDICINE

## 2021-06-04 PROCEDURE — 93000 ELECTROCARDIOGRAM COMPLETE: CPT | Performed by: FAMILY MEDICINE

## 2021-06-04 PROCEDURE — 99397 PER PM REEVAL EST PAT 65+ YR: CPT | Performed by: FAMILY MEDICINE

## 2021-06-04 RX ORDER — SILDENAFIL 100 MG/1
100 TABLET, FILM COATED ORAL DAILY PRN
Qty: 30 TABLET | Refills: 3 | Status: SHIPPED | OUTPATIENT
Start: 2021-06-04

## 2021-06-04 RX ORDER — METFORMIN HYDROCHLORIDE 500 MG/1
500 TABLET, EXTENDED RELEASE ORAL
Qty: 30 TABLET | Refills: 5 | Status: SHIPPED
Start: 2021-06-04 | End: 2021-09-09

## 2021-06-04 RX ORDER — PRAVASTATIN SODIUM 40 MG
TABLET ORAL
Qty: 90 TABLET | Refills: 3 | Status: SHIPPED
Start: 2021-06-04 | End: 2022-03-10 | Stop reason: SDUPTHER

## 2021-06-04 ASSESSMENT — PATIENT HEALTH QUESTIONNAIRE - PHQ9
SUM OF ALL RESPONSES TO PHQ QUESTIONS 1-9: 0
2. FEELING DOWN, DEPRESSED OR HOPELESS: 0
1. LITTLE INTEREST OR PLEASURE IN DOING THINGS: 0
SUM OF ALL RESPONSES TO PHQ QUESTIONS 1-9: 0
SUM OF ALL RESPONSES TO PHQ9 QUESTIONS 1 & 2: 0
SUM OF ALL RESPONSES TO PHQ QUESTIONS 1-9: 0

## 2021-06-04 ASSESSMENT — ENCOUNTER SYMPTOMS
BACK PAIN: 1
EYES NEGATIVE: 1
RESPIRATORY NEGATIVE: 1
ALLERGIC/IMMUNOLOGIC NEGATIVE: 1
GASTROINTESTINAL NEGATIVE: 1

## 2021-06-04 NOTE — PROGRESS NOTES
21     Nadeen Gr    : 1945 Sex: male   Age: 70 y.o. Chief Complaint   Patient presents with    Annual Exam    Discuss Labs    Back Pain     issues with back would like to start therapy       Prior to Admission medications    Medication Sig Start Date End Date Taking?  Authorizing Provider   pravastatin (PRAVACHOL) 40 MG tablet 1 qd 21  Yes Jose Elias Fernandez DO   famotidine (PEPCID) 20 MG tablet TAKE 1 TABLET DAILY 21  Yes Jose Elias Fernandez DO   montelukast (SINGULAIR) 10 MG tablet TAKE 1 TABLET NIGHTLY 21  Yes Jose Elias Fernandez DO   fluticasone (FLONASE) 50 MCG/ACT nasal spray USE 2 SPRAYS NASALLY DAILY 21  Yes Jose Elias Fernandez DO   Clobetasol Propionate 0.05 % SHAM Qd prn 19  Yes Jose Elias Fernandez DO   sildenafil (VIAGRA) 100 MG tablet Take 100 mg by mouth as needed for Erectile Dysfunction (Take 1 everyday by mouth as needed)   Yes Historical Provider, MD   vitamin E 400 UNIT capsule Take 400 Units by mouth daily   Yes Historical Provider, MD   aspirin 81 MG tablet Take 81 mg by mouth daily   Yes Historical Provider, MD   sildenafil (REVATIO) 20 MG tablet Take 1 tablet by mouth daily as needed (as needed) 5/15/19  Yes Jose Elias Fernandez DO   Coenzyme Q10 (CO Q-10) 200 MG CAPS Take 200 mg by mouth daily    Yes Historical Provider, MD   L-Lysine HCl 1000 MG TABS Take 1,000 mg by mouth daily    Yes Historical Provider, MD   Multiple Vitamins-Minerals (THERAPEUTIC MULTIVITAMIN-MINERALS) tablet Take 1 tablet by mouth daily   Yes Historical Provider, MD   Ascorbic Acid (VITAMIN C) 250 MG tablet Take 500 mg by mouth daily   Yes Historical Provider, MD [place] Serenoa repens, 1000 MG CAPS Take 1,000 mg by mouth daily    Yes Historical Provider, MD          HPI:           Review of Systems           Current Outpatient Medications:     pravastatin (PRAVACHOL) 40 MG tablet, 1 qd, Disp: 90 tablet, Rfl: 3    famotidine (PEPCID) 20 MG tablet, TAKE 1 TABLET DAILY, Disp: 90 tablet, Rfl: 3    montelukast (SINGULAIR) 10 MG tablet, TAKE 1 TABLET NIGHTLY, Disp: 90 tablet, Rfl: 3    fluticasone (FLONASE) 50 MCG/ACT nasal spray, USE 2 SPRAYS NASALLY DAILY, Disp: 48 g, Rfl: 3    Clobetasol Propionate 0.05 % SHAM, Qd prn, Disp: 118 mL, Rfl: 1    sildenafil (VIAGRA) 100 MG tablet, Take 100 mg by mouth as needed for Erectile Dysfunction (Take 1 everyday by mouth as needed), Disp: , Rfl:     vitamin E 400 UNIT capsule, Take 400 Units by mouth daily, Disp: , Rfl:     aspirin 81 MG tablet, Take 81 mg by mouth daily, Disp: , Rfl:     sildenafil (REVATIO) 20 MG tablet, Take 1 tablet by mouth daily as needed (as needed), Disp: 30 tablet, Rfl: 3    Coenzyme Q10 (CO Q-10) 200 MG CAPS, Take 200 mg by mouth daily , Disp: , Rfl:     L-Lysine HCl 1000 MG TABS, Take 1,000 mg by mouth daily , Disp: , Rfl:     Multiple Vitamins-Minerals (THERAPEUTIC MULTIVITAMIN-MINERALS) tablet, Take 1 tablet by mouth daily, Disp: , Rfl:     Ascorbic Acid (VITAMIN C) 250 MG tablet, Take 500 mg by mouth daily, Disp: , Rfl:     Saw [place], Serenoa repens, 1000 MG CAPS, Take 1,000 mg by mouth daily , Disp: , Rfl:     Allergies   Allergen Reactions    Atorvastatin     Flomax [Tamsulosin Hcl]     Penicillins     Silodosin        Social History     Tobacco Use    Smoking status: Never Smoker    Smokeless tobacco: Never Used   Substance Use Topics    Alcohol use: Yes     Comment: social    Drug use: No      Past Surgical History:   Procedure Laterality Date    COLONOSCOPY      KNEE SURGERY Right ~6/2015    TENDON RELEASE Right 2005    right hand    VARICOCELECTOMY  1980s    abd.      Family History   Problem Relation Age of Onset    Diabetes Mother     Heart Disease Mother     Stroke Mother     Other Brother      Past Medical History:   Diagnosis Date    Hyperlipidemia     Pleurisy     Vertigo 06/2008       Vitals:    06/04/21 0648   BP: 126/84   Pulse: 88   Temp: 97.6 °F (36.4 °C)   SpO2: 96%   Weight: 198 lb (89.8 kg)   Height: 5' 8.5\" (1.74 m)     BP Readings from Last 3 Encounters:   06/04/21 126/84   06/15/20 (!) 140/76   01/04/20 120/84        Physical Exam             Plan Per Assessment:  Herberth Garduno was seen today for annual exam, discuss labs and back pain. Diagnoses and all orders for this visit:    Annual physical exam  -     EKG 12 lead; Future  -     EKG 12 lead  -     POCT Urinalysis no Micro    Other orders  -     pravastatin (PRAVACHOL) 40 MG tablet; 1 qd            No follow-ups on file. Susanne Munguia DO    Note was generated with the assistance of voice recognition software. Document was reviewed however may contain grammatical errors.

## 2021-07-04 PROBLEM — Z00.00 ANNUAL PHYSICAL EXAM: Status: RESOLVED | Noted: 2019-12-09 | Resolved: 2021-07-04

## 2021-07-07 ENCOUNTER — TELEPHONE (OUTPATIENT)
Dept: PRIMARY CARE CLINIC | Age: 76
End: 2021-07-07

## 2021-07-07 NOTE — TELEPHONE ENCOUNTER
You prescribed the pt metformin at his physical 6/4, he says he has been having an issue with his memory.  They had a party for the 4th and he couldn't remember his nieces and nephews names for the life of him

## 2021-09-01 LAB
ALBUMIN SERPL-MCNC: 4.5 G/DL
ALP BLD-CCNC: 41 U/L
ALT SERPL-CCNC: 27 U/L
ANION GAP SERPL CALCULATED.3IONS-SCNC: NORMAL MMOL/L
AST SERPL-CCNC: 25 U/L
AVERAGE GLUCOSE: NORMAL
BILIRUB SERPL-MCNC: 0.9 MG/DL (ref 0.1–1.4)
BUN BLDV-MCNC: 14 MG/DL
CALCIUM SERPL-MCNC: 9.6 MG/DL
CHLORIDE BLD-SCNC: 105 MMOL/L
CHOLESTEROL, TOTAL: 197 MG/DL
CHOLESTEROL/HDL RATIO: 3.5
CO2: 24 MMOL/L
CREAT SERPL-MCNC: 0.94 MG/DL
GFR CALCULATED: 80
GLUCOSE BLD-MCNC: 109 MG/DL
HBA1C MFR BLD: 5.2 %
HDLC SERPL-MCNC: 56 MG/DL (ref 35–70)
LDL CHOLESTEROL CALCULATED: 118 MG/DL (ref 0–160)
NONHDLC SERPL-MCNC: 141 MG/DL
POTASSIUM SERPL-SCNC: 4 MMOL/L
SODIUM BLD-SCNC: 140 MMOL/L
TOTAL CK: 45 U/L
TOTAL PROTEIN: 6.8
TRIGL SERPL-MCNC: 122 MG/DL
VLDLC SERPL CALC-MCNC: NORMAL MG/DL

## 2021-09-02 DIAGNOSIS — E78.2 MIXED HYPERLIPIDEMIA: Chronic | ICD-10-CM

## 2021-09-02 DIAGNOSIS — E11.9 TYPE 2 DIABETES MELLITUS WITHOUT COMPLICATION, WITHOUT LONG-TERM CURRENT USE OF INSULIN (HCC): ICD-10-CM

## 2021-09-09 ENCOUNTER — OFFICE VISIT (OUTPATIENT)
Dept: PRIMARY CARE CLINIC | Age: 76
End: 2021-09-09
Payer: MEDICARE

## 2021-09-09 VITALS
SYSTOLIC BLOOD PRESSURE: 132 MMHG | OXYGEN SATURATION: 97 % | BODY MASS INDEX: 26.07 KG/M2 | TEMPERATURE: 96.8 F | WEIGHT: 172 LBS | DIASTOLIC BLOOD PRESSURE: 86 MMHG | HEART RATE: 81 BPM | HEIGHT: 68 IN

## 2021-09-09 DIAGNOSIS — Z11.59 NEED FOR HEPATITIS C SCREENING TEST: ICD-10-CM

## 2021-09-09 DIAGNOSIS — Z72.89 OTHER PROBLEMS RELATED TO LIFESTYLE: ICD-10-CM

## 2021-09-09 DIAGNOSIS — J30.1 SEASONAL ALLERGIC RHINITIS DUE TO POLLEN: ICD-10-CM

## 2021-09-09 DIAGNOSIS — Z12.12 SCREENING FOR COLORECTAL CANCER: ICD-10-CM

## 2021-09-09 DIAGNOSIS — K21.9 GASTROESOPHAGEAL REFLUX DISEASE WITHOUT ESOPHAGITIS: Chronic | ICD-10-CM

## 2021-09-09 DIAGNOSIS — Z00.00 ROUTINE GENERAL MEDICAL EXAMINATION AT A HEALTH CARE FACILITY: ICD-10-CM

## 2021-09-09 DIAGNOSIS — Z12.11 SCREENING FOR COLORECTAL CANCER: ICD-10-CM

## 2021-09-09 DIAGNOSIS — R73.01 IMPAIRED FASTING GLUCOSE: ICD-10-CM

## 2021-09-09 DIAGNOSIS — G47.33 OSA (OBSTRUCTIVE SLEEP APNEA): ICD-10-CM

## 2021-09-09 DIAGNOSIS — Z12.5 PROSTATE CANCER SCREENING: ICD-10-CM

## 2021-09-09 DIAGNOSIS — E78.2 MIXED HYPERLIPIDEMIA: Primary | Chronic | ICD-10-CM

## 2021-09-09 DIAGNOSIS — Z23 NEED FOR PROPHYLACTIC VACCINATION AGAINST DIPHTHERIA-TETANUS-PERTUSSIS (DTP): ICD-10-CM

## 2021-09-09 PROCEDURE — G0438 PPPS, INITIAL VISIT: HCPCS | Performed by: FAMILY MEDICINE

## 2021-09-09 PROCEDURE — 99214 OFFICE O/P EST MOD 30 MIN: CPT | Performed by: FAMILY MEDICINE

## 2021-09-09 ASSESSMENT — LIFESTYLE VARIABLES
HOW OFTEN DURING THE LAST YEAR HAVE YOU FOUND THAT YOU WERE NOT ABLE TO STOP DRINKING ONCE YOU HAD STARTED: 0
AUDIT TOTAL SCORE: 3
HOW OFTEN DURING THE LAST YEAR HAVE YOU NEEDED AN ALCOHOLIC DRINK FIRST THING IN THE MORNING TO GET YOURSELF GOING AFTER A NIGHT OF HEAVY DRINKING: 0
HOW OFTEN DO YOU HAVE A DRINK CONTAINING ALCOHOL: 3
HOW OFTEN DURING THE LAST YEAR HAVE YOU FAILED TO DO WHAT WAS NORMALLY EXPECTED FROM YOU BECAUSE OF DRINKING: 0
HOW OFTEN DURING THE LAST YEAR HAVE YOU BEEN UNABLE TO REMEMBER WHAT HAPPENED THE NIGHT BEFORE BECAUSE YOU HAD BEEN DRINKING: 0
HOW OFTEN DURING THE LAST YEAR HAVE YOU HAD A FEELING OF GUILT OR REMORSE AFTER DRINKING: 0
HOW OFTEN DO YOU HAVE SIX OR MORE DRINKS ON ONE OCCASION: 0
HAVE YOU OR SOMEONE ELSE BEEN INJURED AS A RESULT OF YOUR DRINKING: 0
AUDIT-C TOTAL SCORE: 3
HOW MANY STANDARD DRINKS CONTAINING ALCOHOL DO YOU HAVE ON A TYPICAL DAY: 0
HAS A RELATIVE, FRIEND, DOCTOR, OR ANOTHER HEALTH PROFESSIONAL EXPRESSED CONCERN ABOUT YOUR DRINKING OR SUGGESTED YOU CUT DOWN: 0

## 2021-09-09 ASSESSMENT — PATIENT HEALTH QUESTIONNAIRE - PHQ9
SUM OF ALL RESPONSES TO PHQ QUESTIONS 1-9: 0
1. LITTLE INTEREST OR PLEASURE IN DOING THINGS: 0
2. FEELING DOWN, DEPRESSED OR HOPELESS: 0
SUM OF ALL RESPONSES TO PHQ QUESTIONS 1-9: 0
SUM OF ALL RESPONSES TO PHQ QUESTIONS 1-9: 0
SUM OF ALL RESPONSES TO PHQ9 QUESTIONS 1 & 2: 0

## 2021-09-09 ASSESSMENT — ENCOUNTER SYMPTOMS
GASTROINTESTINAL NEGATIVE: 1
ALLERGIC/IMMUNOLOGIC NEGATIVE: 1
EYES NEGATIVE: 1
RESPIRATORY NEGATIVE: 1

## 2021-09-09 NOTE — PATIENT INSTRUCTIONS
Learning About Medical Power of   What is a medical power of ? A medical power of , also called a durable power of  for health care, is one type of the legal forms called advance directives. It lets you name the person you want to make treatment decisions for you if you can't speak or decide for yourself. The person you choose is called your health care agent. This person is also called a health care proxy or health care surrogate. A medical power of  may be called something else in your state. How do you choose a health care agent? Choose your health care agent carefully. This person may or may not be a family member. Talk to the person before you make your final decision. Make sure he or she is comfortable with this responsibility. It's a good idea to choose someone who:  · Is at least 25years old. · Knows you well and understands what makes life meaningful for you. · Understands your Islam and moral values. · Will do what you want, not what he or she wants. · Will be able to make difficult choices at a stressful time. · Will be able to refuse or stop treatment, if that is what you would want, even if you could die. · Will be firm and confident with health professionals if needed. · Will ask questions to get needed information. · Lives near you or agrees to travel to you if needed. Your family may help you make medical decisions while you can still be part of that process. But it's important to choose one person to be your health care agent in case you aren't able to make decisions for yourself. If you don't fill out the legal form and name a health care agent, the decisions your family can make may be limited. A health care agent may be called something else in your state. Who will make decisions for you if you don't have a health care agent? If you don't have a health care agent or a living will, you may not get the care you want. Decisions may be made by family members who disagree about your medical care. Or decisions may be made by a medical professional who doesn't know you well. In some cases, a  makes the decisions. When you name a health care agent, it is very clear who has the power to make health decisions for you. How do you name a health care agent? You name your health care agent on a legal form. This form is usually called a medical power of . Ask your hospital, state bar association, or office on aging where to find these forms. You must sign the form to make it legal. Some states require you to get the form notarized. This means that a person called a  watches you sign the form and then he or she signs the form. Some states also require that two or more witnesses sign the form. Be sure to tell your family members and doctors who your health care agent is. Where can you learn more? Go to https://[employer] Capitalpepiceweb.[employer]. org and sign in to your [employer] account. Enter [id number] in the [employer].la box to learn more about \"Learning About [address]. \"     If you do not have an account, please click on the \"Sign Up Now\" link. Current as of: March 17, 2021               Content Version: 12.9  © 4468-2684 Healthwise, Incorporated. Care instructions adapted under license by [facility] ([facility]). If you have questions about a medical condition or this instruction, always ask your healthcare professional. [place] [address] any warranty or liability for your use of this information. Personalized Preventive Plan for Catarino Mayers - 9/9/2021  Medicare offers a range of preventive health benefits. Some of the tests and screenings are paid in full while other may be subject to a deductible, co-insurance, and/or copay.     Some of these benefits include a comprehensive review of your medical history including lifestyle, illnesses that may run in your family, and various assessments and screenings as appropriate. After reviewing your medical record and screening and assessments performed today your provider may have ordered immunizations, labs, imaging, and/or referrals for you. A list of these orders (if applicable) as well as your Preventive Care list are included within your After Visit Summary for your review. Other Preventive Recommendations:    · A preventive eye exam performed by an eye specialist is recommended every 1-2 years to screen for glaucoma; cataracts, macular degeneration, and other eye disorders. · A preventive dental visit is recommended every 6 months. · Try to get at least 150 minutes of exercise per week or 10,000 steps per day on a pedometer . · Order or download the FREE \"Exercise & Physical Activity: Your Everyday Guide\" from [employer]. Call 6-432.319.6404 or search [employer] online. · You need 1419-5257 mg of calcium and 4861-2594 IU of vitamin D per day. It is possible to meet your calcium requirement with diet alone, but a vitamin D supplement is usually necessary to meet this goal.  · When exposed to the sun, use a sunscreen that protects against both UVA and UVB radiation with an SPF of 30 or greater. Reapply every 2 to 3 hours or after sweating, drying off with a towel, or swimming. · Always wear a seat belt when traveling in a car. Always wear a helmet when riding a bicycle or motorcycle.

## 2021-09-09 NOTE — PROGRESS NOTES
Medicare Annual Wellness Visit  Name: Talha Pearl Date: 2021   MRN: [id number] Sex: Male   Age: 70 y.o. Ethnicity: Non- / Non    : 1945 Race: White (non-)      Weston Vogel is here for Medicare AWV    Screenings for behavioral, psychosocial and functional/safety risks, and cognitive dysfunction are all negative except as indicated below. These results, as well as other patient data from the [address] form, are documented in Flowsheets linked to this Encounter. Allergies   Allergen Reactions    Atorvastatin     Flomax [Tamsulosin Hcl]     Metformin And Related Other (See Comments)     Loss of memory    Penicillins     Silodosin        Prior to Visit Medications    Medication Sig Taking?  Authorizing Provider   pravastatin (PRAVACHOL) 40 MG tablet 1 qd  Pooja Bah DO   Handicap Placard MISC 5 year       Chronic arthralgia  Gaudencio Sharda Fernandez DO   sildenafil (VIAGRA) 100 MG tablet Take 1 tablet by mouth daily as needed for Erectile Dysfunction  Gaudenciosteve Fernandez DO   famotidine (PEPCID) 20 MG tablet TAKE 1 TABLET DAILY  Gaudencio Sharda Fernandez, DO   montelukast (SINGULAIR) 10 MG tablet TAKE 1 TABLET NIGHTLY  Gaudencio Sharda Fernandez, DO   fluticasone (FLONASE) 50 MCG/ACT nasal spray USE 2 SPRAYS NASALLY DAILY  Gaudencio Sharda Fernandez, DO   Clobetasol Propionate 0.05 % SHAM Qd prn  Gaudencio Sharda Fernandez, DO   sildenafil (VIAGRA) 100 MG tablet Take 100 mg by mouth as needed for Erectile Dysfunction (Take 1 everyday by mouth as needed)  Historical Provider, MD   vitamin E 400 UNIT capsule Take 400 Units by mouth daily  Historical Provider, MD   aspirin 81 MG tablet Take 81 mg by mouth daily  Historical Provider, MD   sildenafil (REVATIO) 20 MG tablet Take 1 tablet by mouth daily as needed (as needed)  Pooja Bah DO   Coenzyme Q10 (CO Q-10) 200 MG CAPS Take 200 mg by mouth daily   Historical Provider, MD   L-Lysine HCl 1000 MG TABS Take 1,000 mg by mouth daily Historical Provider, MD   Multiple Vitamins-Minerals (THERAPEUTIC MULTIVITAMIN-MINERALS) tablet Take 1 tablet by mouth daily  Historical Provider, MD   Ascorbic Acid (VITAMIN C) 250 MG tablet Take 500 mg by mouth daily  Historical Provider, Luis Carlos Bricenoenoa repens, 1000 MG CAPS Take 1,000 mg by mouth daily   Historical Provider, MD       Past Medical History:   Diagnosis Date    Hyperlipidemia     Pleurisy     Vertigo 06/2008       Past Surgical History:   Procedure Laterality Date    COLONOSCOPY      KNEE SURGERY Right ~6/2015    TENDON RELEASE Right 2005    right hand    VARICOCELECTOMY  1980s    abd. Family History   Problem Relation Age of Onset    Diabetes Mother     Heart Disease Mother     Stroke Mother     Other Brother        CareTeam (Including outside providers/suppliers regularly involved in providing care):   Patient Care Team:  Carry Sheeba DO as PCP - General  Carry DO Sheeba as PCP - [facility]    Wt Readings from Last 3 Encounters:   09/09/21 172 lb (78 kg)   06/04/21 198 lb (89.8 kg)   06/15/20 204 lb (92.5 kg)     There were no vitals filed for this visit. There is no height or weight on file to calculate BMI. Based upon direct observation of the patient, evaluation of cognition reveals recent and remote memory intact. Patient's complete Health Risk Assessment and screening values have been reviewed and are found in Flowsheets. The following problems were reviewed today and where indicated follow up appointments were made and/or referrals ordered. Positive Risk Factor Screenings with Interventions:          General Health and ACP:  General  In general, how would you say your health is?: Good  In the past 7 days, have you experienced any of the following?  New or Increased Pain, New or Increased Fatigue, Loneliness, Social Isolation, Stress or Anger?: None of These  Do you get the social and emotional support that you need?: Yes  Do you have a Living Will?: Yes  Advance Directives     Power of [address] Will ACP-Advance Directive ACP-Power of     Not on File Not on File Not on File Not on File      General Health Risk Interventions:  · Discussed today and in place.     Health Habits/Nutrition:  Health Habits/Nutrition  Do you exercise for at least 20 minutes 2-3 times per week?: (!) No  Have you lost any weight without trying in the past 3 months?: No  Do you eat only one meal per day?: No  Have you seen the dentist within the past year?: Yes     Health Habits/Nutrition Interventions:  · No current complaints     Safety:  Safety  Do you have working smoke detectors?: Yes  Have all throw rugs been removed or fastened?: (!) No  Do you have non-slip mats or surfaces in all bathtubs/showers?: Yes  Do all of your stairways have a railing or banister?: Yes  Are your doorways, halls and stairs free of clutter?: Yes  Do you always fasten your seatbelt when you are in a car?: Yes  Safety Interventions:  · Home safety tips provided  · Patient declines any further evaluation/treatment for this issue     Personalized Preventive Plan   Current Health Maintenance Status  Immunization History   Administered Date(s) Administered    COVID-19, Pfizer, PF, 30mcg/0.3mL 02/01/2021, 02/22/2021    Influenza A ([id number]) Vaccine PF IM 12/17/2009    Influenza Virus Vaccine 09/13/2000, 09/02/2009, 09/18/2012    Influenza, High Dose (Fluzone 65 yrs and older) 09/03/2019    Influenza, [place], IM, PF (6 mo and older Fluzone, Flulaval, Fluarix, and 3 yrs and older Afluria) 08/31/2015, 09/15/2016, 09/18/2017, 09/17/2020    Influenza, Triv, inactivated, subunit, adjuvanted, IM (Fluad 65 yrs and older) 09/10/2018    Pneumococcal Conjugate 13-valent ([id number]) 03/20/2017    Pneumococcal Polysaccharide ([id number]) 07/13/2015    Tdap (Boostrix, Adacel) 06/08/2011    Zoster Recombinant (Shingrix) 01/15/2019, 05/28/2019        Health Maintenance   Topic Date Due    Hepatitis C screen  Never done    Diabetic foot exam  Never done    Diabetic retinal exam  Never done    Diabetic microalbuminuria test  Never done    Colon cancer screen colonoscopy  12/19/2015    Annual Wellness Visit (AWV)  Never done    DTaP/Tdap/Td vaccine (2 - Td or Tdap) 06/08/2021    A1C test (Diabetic or Prediabetic)  08/31/2022    Lipid screen  08/31/2022    Flu vaccine  Completed    Shingles Vaccine  Completed    Pneumococcal 65+ years Vaccine  Completed    COVID-19 Vaccine  Completed    Hepatitis A vaccine  Aged Out    Hib vaccine  Aged Out    Meningococcal (ACWY) vaccine  Aged Out     Recommendations for [employer] Due: see orders and patient instructions/AVS.  . Recommended screening schedule for the next 5-10 years is provided to the patient in written form: see Patient Instructions/AVS.    There are no diagnoses linked to this encounter. Cardiovascular Disease Risk Counseling: Assessed the patient's risk to develop cardiovascular disease and reviewed main risk factors. Reviewed steps to reduce disease risk including:   · Quitting tobacco use, reducing amount smoked, or not starting the habit  · Making healthy food choices  · Being physically active and gradualy increasing activity levels   · Reduce weight and determine a healthy BMI goal  · Monitor blood pressure and treat if higher than 140/90 mmHg  · Maintain blood total cholesterol levels under 5 mmol/l or 190 mg/dl  · Maintain LDL cholesterol levels under 3.0 mmol/l or 115 mg/dl   · Control blood glucose levels  · Consider taking aspirin (75 mg daily), once blood pressure is controlled   Provided a follow up plan.   Time spent (minutes):

## 2021-09-09 NOTE — PROGRESS NOTES
21     Inna Mckenzie    : 1945 Sex: male   Age: 70 y.o. Chief Complaint   Patient presents with   [address]       Prior to Admission medications    Medication Sig Start Date End Date Taking?  Authorizing Provider   pravastatin (PRAVACHOL) 40 MG tablet 1 qd 21  Yes Sary Antonio DO   Handicap Placard MISC 5 year       Chronic arthralgia 21  Yes Sary Fernandez DO   sildenafil (VIAGRA) 100 MG tablet Take 1 tablet by mouth daily as needed for Erectile Dysfunction 21  Yes Sary Fernandez DO   famotidine (PEPCID) 20 MG tablet TAKE 1 TABLET DAILY 21  Yes Sary Fernandez DO   montelukast (SINGULAIR) 10 MG tablet TAKE 1 TABLET NIGHTLY 21  Yes Sary Fernandez DO   fluticasone (FLONASE) 50 MCG/ACT nasal spray USE 2 SPRAYS NASALLY DAILY 21  Yes Sary Fernandez DO   Clobetasol Propionate 0.05 % SHAM Qd prn 19  Yes Sary Fernandez DO   sildenafil (VIAGRA) 100 MG tablet Take 100 mg by mouth as needed for Erectile Dysfunction (Take 1 everyday by mouth as needed)   Yes Historical Provider, MD   vitamin E 400 UNIT capsule Take 400 Units by mouth daily   Yes Historical Provider, MD   aspirin 81 MG tablet Take 81 mg by mouth daily   Yes Historical Provider, MD   sildenafil (REVATIO) 20 MG tablet Take 1 tablet by mouth daily as needed (as needed) 5/15/19  Yes Sary Fernandez DO   Coenzyme Q10 (CO Q-10) 200 MG CAPS Take 200 mg by mouth daily    Yes Historical Provider, MD   L-Lysine HCl 1000 MG TABS Take 1,000 mg by mouth daily    Yes Historical Provider, MD   Multiple Vitamins-Minerals (THERAPEUTIC MULTIVITAMIN-MINERALS) tablet Take 1 tablet by mouth daily   Yes Historical Provider, MD   Ascorbic Acid (VITAMIN C) 250 MG tablet Take 500 mg by mouth daily   Yes Historical Provider, MD   Saw [place], Serenoa repens, 1000 MG CAPS Take 1,000 mg by mouth daily    Yes Historical Provider, MD   Tdap (ADACEL) 5-2-15.5 LF-MCG/0.5 injection Inject 0.5 mLs into the 200 MG CAPS, Take 200 mg by mouth daily , Disp: , Rfl:     L-Lysine HCl 1000 MG TABS, Take 1,000 mg by mouth daily , Disp: , Rfl:     Multiple Vitamins-Minerals (THERAPEUTIC MULTIVITAMIN-MINERALS) tablet, Take 1 tablet by mouth daily, Disp: , Rfl:     Ascorbic Acid (VITAMIN C) 250 MG tablet, Take 500 mg by mouth daily, Disp: , Rfl:     Saw [place], Serenoa repens, 1000 MG CAPS, Take 1,000 mg by mouth daily , Disp: , Rfl:     Tdap (ADACEL) 5-2-15.5 LF-MCG/0.5 injection, Inject 0.5 mLs into the muscle once for 1 dose, Disp: 0.5 mL, Rfl: 0    Allergies   Allergen Reactions    Atorvastatin     Flomax [Tamsulosin Hcl]     Metformin And Related Other (See Comments)     Loss of memory    Penicillins     Silodosin        Social History     Tobacco Use    Smoking status: Never Smoker    Smokeless tobacco: Never Used   Substance Use Topics    Alcohol use: Yes     Comment: social    Drug use: No      Past Surgical History:   Procedure Laterality Date    COLONOSCOPY      KNEE SURGERY Right ~6/2015    TENDON RELEASE Right 2005    right hand    VARICOCELECTOMY  1980s    abd. Family History   Problem Relation Age of Onset    Diabetes Mother     Heart Disease Mother     Stroke Mother     Other Brother      Past Medical History:   Diagnosis Date    Hyperlipidemia     Pleurisy     Vertigo 06/2008       Vitals:    09/09/21 1304   BP: 132/86   Pulse: 81   Temp: 96.8 °F (36 °C)   SpO2: 97%   Weight: 172 lb (78 kg)   Height: 5' 8\" (1.727 m)     BP Readings from Last 3 Encounters:   09/09/21 132/86   06/04/21 126/84   06/15/20 (!) 140/76        Physical Exam  Vitals and nursing note reviewed. Constitutional:       Appearance: He is well-developed. HENT:      Head: Normocephalic. Right Ear: External ear normal.      Left Ear: External ear normal.      Nose: Nose normal.   Eyes:      Conjunctiva/sclera: Conjunctivae normal.      Pupils: Pupils are equal, round, and reactive to light.    Cardiovascular: Rate and Rhythm: Normal rate. Pulmonary:      Breath sounds: Normal breath sounds. Abdominal:      General: Bowel sounds are normal.      Palpations: Abdomen is soft. Musculoskeletal:         General: Normal range of motion. Cervical back: Normal range of motion and neck supple. Skin:     General: Skin is warm and dry. Neurological:      Mental Status: He is alert and oriented to person, place, and time. Psychiatric:         Behavior: Behavior normal.     Today's vitals physical examination stable. Medically he is doing well we reviewed all labs and stable. Reassessment with me 6 months physical at that time. Plan Per Assessment:  Valeria Hamilton was seen today for discuss labs. Diagnoses and all orders for this visit:    Mixed hyperlipidemia  -     CBC Auto Differential; Future  -     Comprehensive Metabolic Panel; Future  -     Lipid Panel; Future  -     Hemoglobin A1C; Future  -     T4; Future  -     TSH without Reflex; Future  -     PSA screening; Future    Impaired fasting glucose  -     CBC Auto Differential; Future  -     Comprehensive Metabolic Panel; Future  -     Lipid Panel; Future  -     Hemoglobin A1C; Future  -     T4; Future  -     TSH without Reflex; Future  -     PSA screening; Future    Seasonal allergic rhinitis due to pollen    C/f of BRIGID (obstructive sleep apnea)  -     CBC Auto Differential; Future  -     Comprehensive Metabolic Panel; Future  -     Lipid Panel; Future  -     Hemoglobin A1C; Future  -     T4; Future  -     TSH without Reflex; Future  -     PSA screening; Future    Gastroesophageal reflux disease without esophagitis  -     CBC Auto Differential; Future  -     Comprehensive Metabolic Panel; Future  -     Lipid Panel; Future  -     Hemoglobin A1C; Future  -     T4; Future  -     TSH without Reflex; Future  -     PSA screening; Future    Prostate cancer screening  -     PSA screening;  Future            Return in about 6 months (around 3/9/2022) for Urbano Acevedo DO    Note was generated with the assistance of voice recognition software. Document was reviewed however may contain grammatical errors.

## 2021-09-13 DIAGNOSIS — Z12.12 SCREENING FOR COLORECTAL CANCER: ICD-10-CM

## 2021-09-13 DIAGNOSIS — Z00.00 ROUTINE GENERAL MEDICAL EXAMINATION AT A HEALTH CARE FACILITY: ICD-10-CM

## 2021-09-13 DIAGNOSIS — Z12.11 SCREENING FOR COLORECTAL CANCER: ICD-10-CM

## 2021-09-13 LAB
CONTROL: NORMAL
HEMOCCULT STL QL: NEGATIVE

## 2021-09-13 PROCEDURE — 82274 ASSAY TEST FOR BLOOD FECAL: CPT | Performed by: FAMILY MEDICINE

## 2022-03-05 LAB
ALBUMIN SERPL-MCNC: 4.6 G/DL
ALP BLD-CCNC: 48 U/L
ALT SERPL-CCNC: 20 U/L
ANION GAP SERPL CALCULATED.3IONS-SCNC: NORMAL MMOL/L
ANTIBODY: NORMAL
AST SERPL-CCNC: 22 U/L
AVERAGE GLUCOSE: NORMAL
BASOPHILS ABSOLUTE: 29 /ΜL
BASOPHILS RELATIVE PERCENT: 0.6 %
BILIRUB SERPL-MCNC: 0.9 MG/DL (ref 0.1–1.4)
BUN BLDV-MCNC: 12 MG/DL
CALCIUM SERPL-MCNC: 9.8 MG/DL
CHLORIDE BLD-SCNC: 104 MMOL/L
CHOLESTEROL, TOTAL: 223 MG/DL
CHOLESTEROL/HDL RATIO: 3.4
CO2: 31 MMOL/L
CREAT SERPL-MCNC: 0.86 MG/DL
EOSINOPHILS ABSOLUTE: 149 /ΜL
EOSINOPHILS RELATIVE PERCENT: 3.1 %
GFR CALCULATED: 85
GLUCOSE BLD-MCNC: 118 MG/DL
HBA1C MFR BLD: 5.4 %
HCT VFR BLD CALC: 44.5 % (ref 41–53)
HDLC SERPL-MCNC: 66 MG/DL (ref 35–70)
HEMOGLOBIN: 15.8 G/DL (ref 13.5–17.5)
LDL CHOLESTEROL CALCULATED: 127 MG/DL (ref 0–160)
LYMPHOCYTES ABSOLUTE: 1886 /ΜL
LYMPHOCYTES RELATIVE PERCENT: 39.3 %
MCH RBC QN AUTO: 33.5 PG
MCHC RBC AUTO-ENTMCNC: 35.5 G/DL
MCV RBC AUTO: 94.5 FL
MONOCYTES ABSOLUTE: 514 /ΜL
MONOCYTES RELATIVE PERCENT: 10.7 %
NEUTROPHILS ABSOLUTE: 2222 /ΜL
NEUTROPHILS RELATIVE PERCENT: 46.3 %
NONHDLC SERPL-MCNC: 157 MG/DL
PDW BLD-RTO: 11.8 %
PLATELET # BLD: 144 K/ΜL
PMV BLD AUTO: 10.6 FL
POTASSIUM SERPL-SCNC: 4.9 MMOL/L
PROSTATE SPECIFIC ANTIGEN: 0.91 NG/ML
RBC # BLD: 4.71 10^6/ΜL
SODIUM BLD-SCNC: 139 MMOL/L
T4 TOTAL: 7
TOTAL PROTEIN: 7.1
TRIGL SERPL-MCNC: 178 MG/DL
TSH SERPL DL<=0.05 MIU/L-ACNC: 1.18 UIU/ML
VLDLC SERPL CALC-MCNC: NORMAL MG/DL
WBC # BLD: 4.8 10^3/ML

## 2022-03-08 DIAGNOSIS — Z12.5 PROSTATE CANCER SCREENING: ICD-10-CM

## 2022-03-08 DIAGNOSIS — E78.2 MIXED HYPERLIPIDEMIA: Chronic | ICD-10-CM

## 2022-03-08 DIAGNOSIS — Z00.00 ROUTINE GENERAL MEDICAL EXAMINATION AT A HEALTH CARE FACILITY: ICD-10-CM

## 2022-03-08 DIAGNOSIS — R73.01 IMPAIRED FASTING GLUCOSE: ICD-10-CM

## 2022-03-08 DIAGNOSIS — K21.9 GASTROESOPHAGEAL REFLUX DISEASE WITHOUT ESOPHAGITIS: Chronic | ICD-10-CM

## 2022-03-08 DIAGNOSIS — G47.33 OSA (OBSTRUCTIVE SLEEP APNEA): ICD-10-CM

## 2022-03-08 DIAGNOSIS — Z72.89 OTHER PROBLEMS RELATED TO LIFESTYLE: ICD-10-CM

## 2022-03-08 DIAGNOSIS — Z11.59 NEED FOR HEPATITIS C SCREENING TEST: ICD-10-CM

## 2022-03-10 ENCOUNTER — OFFICE VISIT (OUTPATIENT)
Dept: PRIMARY CARE CLINIC | Age: 77
End: 2022-03-10
Payer: MEDICARE

## 2022-03-10 VITALS
TEMPERATURE: 98 F | HEIGHT: 68 IN | BODY MASS INDEX: 26.98 KG/M2 | SYSTOLIC BLOOD PRESSURE: 120 MMHG | DIASTOLIC BLOOD PRESSURE: 78 MMHG | OXYGEN SATURATION: 95 % | HEART RATE: 81 BPM | WEIGHT: 178 LBS

## 2022-03-10 DIAGNOSIS — E78.2 MIXED HYPERLIPIDEMIA: Chronic | ICD-10-CM

## 2022-03-10 DIAGNOSIS — L98.9 SKIN LESION OF BACK: ICD-10-CM

## 2022-03-10 DIAGNOSIS — J01.00 ACUTE NON-RECURRENT MAXILLARY SINUSITIS: Primary | ICD-10-CM

## 2022-03-10 DIAGNOSIS — D69.6 THROMBOCYTOPENIA, UNSPECIFIED (HCC): ICD-10-CM

## 2022-03-10 DIAGNOSIS — R73.01 IMPAIRED FASTING GLUCOSE: ICD-10-CM

## 2022-03-10 PROCEDURE — 99214 OFFICE O/P EST MOD 30 MIN: CPT | Performed by: FAMILY MEDICINE

## 2022-03-10 RX ORDER — FLUTICASONE PROPIONATE 50 MCG
SPRAY, SUSPENSION (ML) NASAL
Qty: 48 G | Refills: 3 | Status: SHIPPED | OUTPATIENT
Start: 2022-03-10

## 2022-03-10 RX ORDER — MONTELUKAST SODIUM 10 MG/1
TABLET ORAL
Qty: 90 TABLET | Refills: 3 | Status: SHIPPED | OUTPATIENT
Start: 2022-03-10

## 2022-03-10 RX ORDER — FAMOTIDINE 20 MG/1
TABLET, FILM COATED ORAL
Qty: 90 TABLET | Refills: 3 | Status: SHIPPED | OUTPATIENT
Start: 2022-03-10

## 2022-03-10 RX ORDER — PREDNISONE 1 MG/1
TABLET ORAL
Qty: 30 TABLET | Refills: 0 | Status: SHIPPED
Start: 2022-03-10 | End: 2022-09-12

## 2022-03-10 RX ORDER — DOXYCYCLINE HYCLATE 100 MG/1
100 CAPSULE ORAL 2 TIMES DAILY
Qty: 28 CAPSULE | Refills: 1 | Status: SHIPPED | OUTPATIENT
Start: 2022-03-10 | End: 2022-03-24

## 2022-03-10 RX ORDER — PRAVASTATIN SODIUM 40 MG
TABLET ORAL
Qty: 90 TABLET | Refills: 3 | Status: SHIPPED | OUTPATIENT
Start: 2022-03-10

## 2022-03-10 ASSESSMENT — ENCOUNTER SYMPTOMS
RESPIRATORY NEGATIVE: 1
SINUS PRESSURE: 1
SINUS PAIN: 1
EYES NEGATIVE: 1
ALLERGIC/IMMUNOLOGIC NEGATIVE: 1
GASTROINTESTINAL NEGATIVE: 1

## 2022-03-10 NOTE — PROGRESS NOTES
3/10/22     Heber Grigsby    : 1945 Sex: male   Age: 78 y.o. Chief Complaint   Patient presents with    Discuss Labs    Sinus Problem     x 1 week       Prior to Admission medications    Medication Sig Start Date End Date Taking?  Authorizing Provider   famotidine (PEPCID) 20 MG tablet TAKE 1 TABLET DAILY 3/10/22  Yes Esperanza Fernandez DO   fluticasone Stella Ferries) 50 MCG/ACT nasal spray USE 2 SPRAYS NASALLY DAILY 3/10/22  Yes Esperanza Fernandez DO   montelukast (SINGULAIR) 10 MG tablet TAKE 1 TABLET NIGHTLY 3/10/22  Yes Esperanza Fernandez DO   pravastatin (PRAVACHOL) 40 MG tablet 1 qd 3/10/22  Yes Esperanza Fernandez DO   doxycycline hyclate (VIBRAMYCIN) 100 MG capsule Take 1 capsule by mouth 2 times daily for 14 days 3/10/22 3/24/22 Yes Esperanza Fernandez DO   predniSONE (DELTASONE) 5 MG tablet 4 tablets daily for 3 days then 3 tablets daily for 3 days then 2 tablets daily for 3 days then 1 tablet daily for 3 days 3/10/22  Yes Vianey Staley,    Handicap Placard MISC 5 year       Chronic arthralgia 21  Yes Vianey Staley DO   sildenafil (VIAGRA) 100 MG tablet Take 1 tablet by mouth daily as needed for Erectile Dysfunction 21  Yes Esperanza Fernandez DO   Clobetasol Propionate 0.05 % SHAM Qd prn 19  Yes Esperanza Fernandez DO   vitamin E 400 UNIT capsule Take 400 Units by mouth daily   Yes Historical Provider, MD   aspirin 81 MG tablet Take 81 mg by mouth daily   Yes Historical Provider, MD   sildenafil (REVATIO) 20 MG tablet Take 1 tablet by mouth daily as needed (as needed) 5/15/19  Yes Esperanza Fernandez DO   Coenzyme Q10 (CO Q-10) 200 MG CAPS Take 200 mg by mouth daily    Yes Historical Provider, MD   L-Lysine HCl 1000 MG TABS Take 1,000 mg by mouth daily    Yes Historical Provider, MD   Multiple Vitamins-Minerals (THERAPEUTIC MULTIVITAMIN-MINERALS) tablet Take 1 tablet by mouth daily   Yes Historical Provider, MD   Ascorbic Acid (VITAMIN C) 250 MG tablet Take 500 mg by mouth daily   Yes Historical Provider, MD   Saw [place], Serenoa repens, 1000 MG CAPS Take 1,000 mg by mouth daily    Yes Historical Provider, MD          HPI: Patient evaluated today problems with upper respiratory cough congestion sinus. Onset past couple weeks. Placed on doxycycline and prednisone and will follow up if persistent. Skin lesion on the back has been followed by dermatology and he does plan follow-up visit in the coming week or 2. Should respond well to the prednisone doxycycline and continue clobetasol cream.  Impaired fasting glucose stable A1c is well controlled. Medically otherwise doing well. Physical will be planned in September. Review of Systems   Constitutional: Negative. HENT: Positive for congestion, sinus pressure and sinus pain. Eyes: Negative. Respiratory: Negative. Gastrointestinal: Negative. Endocrine: Negative. Genitourinary: Negative. Musculoskeletal: Negative. Skin: Negative. Allergic/Immunologic: Negative. Neurological: Negative. Hematological: Negative. Psychiatric/Behavioral: Negative.                Current Outpatient Medications:     famotidine (PEPCID) 20 MG tablet, TAKE 1 TABLET DAILY, Disp: 90 tablet, Rfl: 3    fluticasone (FLONASE) 50 MCG/ACT nasal spray, USE 2 SPRAYS NASALLY DAILY, Disp: 48 g, Rfl: 3    montelukast (SINGULAIR) 10 MG tablet, TAKE 1 TABLET NIGHTLY, Disp: 90 tablet, Rfl: 3    pravastatin (PRAVACHOL) 40 MG tablet, 1 qd, Disp: 90 tablet, Rfl: 3    doxycycline hyclate (VIBRAMYCIN) 100 MG capsule, Take 1 capsule by mouth 2 times daily for 14 days, Disp: 28 capsule, Rfl: 1    predniSONE (DELTASONE) 5 MG tablet, 4 tablets daily for 3 days then 3 tablets daily for 3 days then 2 tablets daily for 3 days then 1 tablet daily for 3 days, Disp: 30 tablet, Rfl: 0    Handicap Placard MISC, 5 year       Chronic arthralgia, Disp: 1 each, Rfl: 0    sildenafil (VIAGRA) 100 MG tablet, Take 1 tablet by mouth daily as needed for Erectile Dysfunction, Disp: 30 tablet, Rfl: 3    Clobetasol Propionate 0.05 % SHAM, Qd prn, Disp: 118 mL, Rfl: 1    vitamin E 400 UNIT capsule, Take 400 Units by mouth daily, Disp: , Rfl:     aspirin 81 MG tablet, Take 81 mg by mouth daily, Disp: , Rfl:     sildenafil (REVATIO) 20 MG tablet, Take 1 tablet by mouth daily as needed (as needed), Disp: 30 tablet, Rfl: 3    Coenzyme Q10 (CO Q-10) 200 MG CAPS, Take 200 mg by mouth daily , Disp: , Rfl:     L-Lysine HCl 1000 MG TABS, Take 1,000 mg by mouth daily , Disp: , Rfl:     Multiple Vitamins-Minerals (THERAPEUTIC MULTIVITAMIN-MINERALS) tablet, Take 1 tablet by mouth daily, Disp: , Rfl:     Ascorbic Acid (VITAMIN C) 250 MG tablet, Take 500 mg by mouth daily, Disp: , Rfl:     Saw [place], Serenoa repens, 1000 MG CAPS, Take 1,000 mg by mouth daily , Disp: , Rfl:     Allergies   Allergen Reactions    Atorvastatin     Flomax [Tamsulosin Hcl]     Metformin And Related Other (See Comments)     Loss of memory    Penicillins     Silodosin        Social History     Tobacco Use    Smoking status: Never Smoker    Smokeless tobacco: Never Used   Substance Use Topics    Alcohol use: Yes     Comment: social    Drug use: No      Past Surgical History:   Procedure Laterality Date    COLONOSCOPY      KNEE SURGERY Right ~6/2015    TENDON RELEASE Right 2005    right hand    VARICOCELECTOMY  1980s    abd. Family History   Problem Relation Age of Onset    Diabetes Mother     Heart Disease Mother     Stroke Mother     Other Brother      Past Medical History:   Diagnosis Date    Hyperlipidemia     Pleurisy     Vertigo 06/2008       Vitals:    03/10/22 0716   BP: 120/78   Pulse: 81   Temp: 98 °F (36.7 °C)   SpO2: 95%   Weight: 178 lb (80.7 kg)   Height: 5' 8\" (1.727 m)     BP Readings from Last 3 Encounters:   03/10/22 120/78   09/09/21 132/86   06/04/21 126/84        Physical Exam  Vitals and nursing note reviewed.    Constitutional:       Appearance: He is well-developed. HENT:      Head: Normocephalic. Right Ear: External ear normal.      Left Ear: External ear normal.      Nose: Nose normal.   Eyes:      Conjunctiva/sclera: Conjunctivae normal.      Pupils: Pupils are equal, round, and reactive to light. Cardiovascular:      Rate and Rhythm: Normal rate. Pulmonary:      Breath sounds: Normal breath sounds. Abdominal:      General: Bowel sounds are normal.      Palpations: Abdomen is soft. Musculoskeletal:         General: Normal range of motion. Cervical back: Normal range of motion and neck supple. Skin:     General: Skin is warm and dry. Neurological:      Mental Status: He is alert and oriented to person, place, and time. Psychiatric:         Behavior: Behavior normal.        Today's vitals physical examination stable. Blood pressures controlled. Reviewed medications as prescribed. Reassessment with me in 6 months and blood work again prior. Current labs reviewed are stable and continue with diet exercise present meds.     Lab Results   Component Value Date    TSH 1.18 03/04/2022    TSH 1.26 05/21/2021    D1BAAAX 7.0 03/04/2022    B1PQASA 5.8 05/21/2021     Lab Results   Component Value Date    CHOL 223 03/04/2022    CHOL 197 08/31/2021     Lab Results   Component Value Date    TRIG 178 03/04/2022    TRIG 122 08/31/2021     Lab Results   Component Value Date    HDL 66 03/04/2022    HDL 56 08/31/2021     No results found for: [facility]  Lab Results   Component Value Date    VLDL 149 05/09/2019     Lab Results   Component Value Date    CHOLHDLRATIO 3.4 03/04/2022    CHOLHDLRATIO 3.5 08/31/2021     Lab Results   Component Value Date    WBC 4.8 03/04/2022    HGB 15.8 03/04/2022    HCT 44.5 03/04/2022    MCV 94.5 03/04/2022     03/04/2022    LYMPHOPCT 39.3 03/04/2022    RBC 4.71 03/04/2022    MCH 33.5 03/04/2022    MCHC 35.5 03/04/2022    RDW 11.8 03/04/2022     Lab Results   Component Value Date     03/04/2022    K 4.9 03/04/2022     03/04/2022    CO2 31 03/04/2022    BUN 12 03/04/2022    CREATININE 0.86 03/04/2022    GLUCOSE 118 03/04/2022    CALCIUM 9.8 03/04/2022    PROT 7.2 09/21/2016    LABALBU 4.6 03/04/2022    BILITOT 0.9 03/04/2022    ALKPHOS 48 03/04/2022    AST 22 03/04/2022    ALT 20 03/04/2022    LABGLOM 85 03/04/2022    GFRAA >60 09/21/2016        Lab Results   Component Value Date    PSA 0.91 03/04/2022    PSA 0.8 05/21/2021    PSA 0.8 12/02/2019      Lab Results   Component Value Date    LABA1C 5.4 03/04/2022    LABA1C 5.2 08/31/2021    LABA1C 6.5 05/21/2021     No results found for: EAG       Plan Per Assessment:  Bety Frank was seen today for discuss labs and sinus problem. Diagnoses and all orders for this visit:    Acute non-recurrent maxillary sinusitis    Skin lesion of back    Mixed hyperlipidemia  -     CBC with Auto Differential; Future  -     Comprehensive Metabolic Panel; Future  -     Lipid Panel; Future  -     T4; Future  -     TSH; Future  -     Hemoglobin A1C; Future    Impaired fasting glucose  -     CBC with Auto Differential; Future  -     Comprehensive Metabolic Panel; Future  -     Lipid Panel; Future  -     T4; Future  -     TSH; Future  -     Hemoglobin A1C; Future    Thrombocytopenia, unspecified    Other orders  -     famotidine (PEPCID) 20 MG tablet; TAKE 1 TABLET DAILY  -     fluticasone (FLONASE) 50 MCG/ACT nasal spray; USE 2 SPRAYS NASALLY DAILY  -     montelukast (SINGULAIR) 10 MG tablet; TAKE 1 TABLET NIGHTLY  -     pravastatin (PRAVACHOL) 40 MG tablet; 1 qd  -     doxycycline hyclate (VIBRAMYCIN) 100 MG capsule; Take 1 capsule by mouth 2 times daily for 14 days  -     predniSONE (DELTASONE) 5 MG tablet; 4 tablets daily for 3 days then 3 tablets daily for 3 days then 2 tablets daily for 3 days then 1 tablet daily for 3 days            Return in about 6 months (around 9/10/2022) for phys.       Olu Rogers, DO    Note was generated with the assistance of voice recognition software. Document was reviewed however may contain grammatical errors.

## 2022-03-21 RX ORDER — DOXYCYCLINE HYCLATE 100 MG/1
CAPSULE ORAL
Qty: 28 CAPSULE | Refills: 25 | OUTPATIENT
Start: 2022-03-21

## 2022-09-02 LAB
ALBUMIN SERPL-MCNC: 4.5 G/DL
ALP BLD-CCNC: 48 U/L
ALT SERPL-CCNC: 25 U/L
ANION GAP SERPL CALCULATED.3IONS-SCNC: NORMAL MMOL/L
AST SERPL-CCNC: 22 U/L
AVERAGE GLUCOSE: NORMAL
BASOPHILS ABSOLUTE: 50 /ΜL
BASOPHILS RELATIVE PERCENT: 0.9 %
BILIRUB SERPL-MCNC: 0.8 MG/DL (ref 0.1–1.4)
BUN BLDV-MCNC: 17 MG/DL
CALCIUM SERPL-MCNC: 9.4 MG/DL
CHLORIDE BLD-SCNC: 104 MMOL/L
CHOLESTEROL, TOTAL: 236 MG/DL
CHOLESTEROL/HDL RATIO: 3.5
CO2: 27 MMOL/L
CREAT SERPL-MCNC: 0.8 MG/DL
EOSINOPHILS ABSOLUTE: 151 /ΜL
EOSINOPHILS RELATIVE PERCENT: 2.7 %
GFR CALCULATED: 93
GLUCOSE BLD-MCNC: 111 MG/DL
HBA1C MFR BLD: 5.4 %
HCT VFR BLD CALC: 43.4 % (ref 41–53)
HDLC SERPL-MCNC: 67 MG/DL (ref 35–70)
HEMOGLOBIN: 15.3 G/DL (ref 13.5–17.5)
LDL CHOLESTEROL CALCULATED: 131 MG/DL (ref 0–160)
LYMPHOCYTES ABSOLUTE: 2212 /ΜL
LYMPHOCYTES RELATIVE PERCENT: 39.5 %
MCH RBC QN AUTO: 33.3 PG
MCHC RBC AUTO-ENTMCNC: 35.3 G/DL
MCV RBC AUTO: 94.3 FL
MONOCYTES ABSOLUTE: 683 /ΜL
MONOCYTES RELATIVE PERCENT: 12.2 %
NEUTROPHILS ABSOLUTE: 2503 /ΜL
NEUTROPHILS RELATIVE PERCENT: 44.7 %
NONHDLC SERPL-MCNC: 169 MG/DL
PDW BLD-RTO: 12.8 %
PLATELET # BLD: 149 K/ΜL
PMV BLD AUTO: 11 FL
POTASSIUM SERPL-SCNC: 4.2 MMOL/L
RBC # BLD: 4.6 10^6/ΜL
SODIUM BLD-SCNC: 141 MMOL/L
T4 TOTAL: 6.1
TOTAL PROTEIN: 7.1
TRIGL SERPL-MCNC: 236 MG/DL
TSH SERPL DL<=0.05 MIU/L-ACNC: 1.42 UIU/ML
VLDLC SERPL CALC-MCNC: NORMAL MG/DL
WBC # BLD: 5.6 10^3/ML

## 2022-09-06 DIAGNOSIS — E78.2 MIXED HYPERLIPIDEMIA: Chronic | ICD-10-CM

## 2022-09-06 DIAGNOSIS — R73.01 IMPAIRED FASTING GLUCOSE: ICD-10-CM

## 2022-09-12 ENCOUNTER — OFFICE VISIT (OUTPATIENT)
Dept: PRIMARY CARE CLINIC | Age: 77
End: 2022-09-12
Payer: MEDICARE

## 2022-09-12 VITALS
WEIGHT: 188 LBS | SYSTOLIC BLOOD PRESSURE: 130 MMHG | OXYGEN SATURATION: 95 % | TEMPERATURE: 98.1 F | DIASTOLIC BLOOD PRESSURE: 72 MMHG | HEART RATE: 84 BPM | BODY MASS INDEX: 28.59 KG/M2

## 2022-09-12 DIAGNOSIS — E78.2 MIXED HYPERLIPIDEMIA: Chronic | ICD-10-CM

## 2022-09-12 DIAGNOSIS — G89.29 CHRONIC MIDLINE LOW BACK PAIN WITHOUT SCIATICA: ICD-10-CM

## 2022-09-12 DIAGNOSIS — R73.01 IMPAIRED FASTING GLUCOSE: ICD-10-CM

## 2022-09-12 DIAGNOSIS — Z00.00 ANNUAL PHYSICAL EXAM: Primary | ICD-10-CM

## 2022-09-12 DIAGNOSIS — M54.50 CHRONIC MIDLINE LOW BACK PAIN WITHOUT SCIATICA: ICD-10-CM

## 2022-09-12 LAB
BILIRUBIN, POC: NORMAL
BLOOD URINE, POC: NORMAL
CLARITY, POC: CLEAR
COLOR, POC: YELLOW
GLUCOSE URINE, POC: NORMAL
KETONES, POC: NORMAL
LEUKOCYTE EST, POC: NORMAL
NITRITE, POC: NORMAL
PH, POC: 6
PROTEIN, POC: NORMAL
SPECIFIC GRAVITY, POC: 1.02
UROBILINOGEN, POC: 0.2

## 2022-09-12 PROCEDURE — 93000 ELECTROCARDIOGRAM COMPLETE: CPT | Performed by: FAMILY MEDICINE

## 2022-09-12 PROCEDURE — 81002 URINALYSIS NONAUTO W/O SCOPE: CPT | Performed by: FAMILY MEDICINE

## 2022-09-12 PROCEDURE — 99397 PER PM REEVAL EST PAT 65+ YR: CPT | Performed by: FAMILY MEDICINE

## 2022-09-12 RX ORDER — VALACYCLOVIR HYDROCHLORIDE 500 MG/1
TABLET, FILM COATED ORAL DAILY
COMMUNITY
Start: 2022-08-23

## 2022-09-12 ASSESSMENT — PATIENT HEALTH QUESTIONNAIRE - PHQ9
SUM OF ALL RESPONSES TO PHQ9 QUESTIONS 1 & 2: 0
SUM OF ALL RESPONSES TO PHQ QUESTIONS 1-9: 0
2. FEELING DOWN, DEPRESSED OR HOPELESS: 0
1. LITTLE INTEREST OR PLEASURE IN DOING THINGS: 0
SUM OF ALL RESPONSES TO PHQ QUESTIONS 1-9: 0

## 2022-09-12 ASSESSMENT — ENCOUNTER SYMPTOMS
RESPIRATORY NEGATIVE: 1
GASTROINTESTINAL NEGATIVE: 1
EYES NEGATIVE: 1
ALLERGIC/IMMUNOLOGIC NEGATIVE: 1

## 2022-09-12 NOTE — PROGRESS NOTES
22     Edmundo Schmidt    : 1945 Sex: male   Age: 78 y.o. Chief Complaint   Patient presents with    Annual Exam    Discuss Labs    Other     Saw dr Ted Diez and was started out on valtrex 500 mg daily       Prior to Admission medications    Medication Sig Start Date End Date Taking? Authorizing Provider   valACYclovir (VALTREX) 500 MG tablet daily 22  Yes Historical Provider, MD   famotidine (PEPCID) 20 MG tablet TAKE 1 TABLET DAILY 3/10/22  Yes Rudy Fernandez, DO   fluticasone (FLONASE) 50 MCG/ACT nasal spray USE 2 SPRAYS NASALLY DAILY 3/10/22  Yes Rudy Howard Rebecca, DO   montelukast (SINGULAIR) 10 MG tablet TAKE 1 TABLET NIGHTLY 3/10/22  Yes Rudyfozia Fernandez, DO   pravastatin (PRAVACHOL) 40 MG tablet 1 qd 3/10/22  Yes Rudy Fernandez, DO   sildenafil (VIAGRA) 100 MG tablet Take 1 tablet by mouth daily as needed for Erectile Dysfunction 21  Yes Rudyfozia Fernandez, DO   Clobetasol Propionate 0.05 % SHAM Qd prn 19  Yes Rudy Faby Rebecca, DO   vitamin E 400 UNIT capsule Take 400 Units by mouth daily   Yes Historical Provider, MD   aspirin 81 MG tablet Take 81 mg by mouth daily   Yes Historical Provider, MD   sildenafil (REVATIO) 20 MG tablet Take 1 tablet by mouth daily as needed (as needed) 5/15/19  Yes Rudy Fernandez DO   Coenzyme Q10 (CO Q-10) 200 MG CAPS Take 200 mg by mouth daily    Yes Historical Provider, MD   L-Lysine HCl 1000 MG TABS Take 1,000 mg by mouth daily    Yes Historical Provider, MD   Multiple Vitamins-Minerals (THERAPEUTIC MULTIVITAMIN-MINERALS) tablet Take 1 tablet by mouth daily   Yes Historical Provider, MD   Ascorbic Acid (VITAMIN C) 250 MG tablet Take 500 mg by mouth daily   Yes Historical Provider, Wilmer Briceno repens, 1000 MG CAPS Take 1,000 mg by mouth daily    Yes Historical Provider, MD          HPI: Patient evaluated today for health maintenance physical impaired fasting glucose hyperlipidemia chronic low back discomfort.   Overall medically has been stable. Medications well-tolerated. Systems review stable. Low back pain has been chronic and very exacerbated with some lifting this past week. We will consider some physical therapy if needed. Prescription provided today. Follow-up if necessary and persistent discomfort. Medically otherwise is very stable. Mild sugar elevation encouraging diet and exercise. Repeat labs again in 6 months. Stool occult today was negative. Fit test provided and return with next visit. Review of Systems   Constitutional: Negative. HENT: Negative. Eyes: Negative. Respiratory: Negative. Gastrointestinal: Negative. Endocrine: Negative. Genitourinary: Negative. Musculoskeletal: Negative. Skin: Negative. Allergic/Immunologic: Negative. Neurological: Negative. Hematological: Negative. Psychiatric/Behavioral: Negative. A systems review is stable. Back pain as noted and physical therapy recommended.       Current Outpatient Medications:     valACYclovir (VALTREX) 500 MG tablet, daily, Disp: , Rfl:     famotidine (PEPCID) 20 MG tablet, TAKE 1 TABLET DAILY, Disp: 90 tablet, Rfl: 3    fluticasone (FLONASE) 50 MCG/ACT nasal spray, USE 2 SPRAYS NASALLY DAILY, Disp: 48 g, Rfl: 3    montelukast (SINGULAIR) 10 MG tablet, TAKE 1 TABLET NIGHTLY, Disp: 90 tablet, Rfl: 3    pravastatin (PRAVACHOL) 40 MG tablet, 1 qd, Disp: 90 tablet, Rfl: 3    sildenafil (VIAGRA) 100 MG tablet, Take 1 tablet by mouth daily as needed for Erectile Dysfunction, Disp: 30 tablet, Rfl: 3    Clobetasol Propionate 0.05 % SHAM, Qd prn, Disp: 118 mL, Rfl: 1    vitamin E 400 UNIT capsule, Take 400 Units by mouth daily, Disp: , Rfl:     aspirin 81 MG tablet, Take 81 mg by mouth daily, Disp: , Rfl:     sildenafil (REVATIO) 20 MG tablet, Take 1 tablet by mouth daily as needed (as needed), Disp: 30 tablet, Rfl: 3    Coenzyme Q10 (CO Q-10) 200 MG CAPS, Take 200 mg by mouth daily , Disp: , Rfl: L-Lysine HCl 1000 MG TABS, Take 1,000 mg by mouth daily , Disp: , Rfl:     Multiple Vitamins-Minerals (THERAPEUTIC MULTIVITAMIN-MINERALS) tablet, Take 1 tablet by mouth daily, Disp: , Rfl:     Ascorbic Acid (VITAMIN C) 250 MG tablet, Take 500 mg by mouth daily, Disp: , Rfl:     Saw [place], Serenoa repens, 1000 MG CAPS, Take 1,000 mg by mouth daily , Disp: , Rfl:     Allergies   Allergen Reactions    Atorvastatin     Flomax [Tamsulosin Hcl]     Metformin And Related Other (See Comments)     Loss of memory    Penicillins     Silodosin        Social History     Tobacco Use    Smoking status: Never    Smokeless tobacco: Never   Substance Use Topics    Alcohol use: Yes     Comment: social    Drug use: No      Past Surgical History:   Procedure Laterality Date    COLONOSCOPY      KNEE SURGERY Right ~6/2015    TENDON RELEASE Right 2005    right hand    VARICOCELECTOMY  1980s    abd. Family History   Problem Relation Age of Onset    Diabetes Mother     Heart Disease Mother     Stroke Mother     Other Brother      Past Medical History:   Diagnosis Date    Hyperlipidemia     Pleurisy     Vertigo 06/2008       Vitals:    09/12/22 0716   BP: 130/72   Pulse: 84   Temp: 98.1 °F (36.7 °C)   SpO2: 95%   Weight: 188 lb (85.3 kg)     BP Readings from Last 3 Encounters:   09/12/22 130/72   03/10/22 120/78   09/09/21 132/86        Physical Exam  Vitals and nursing note reviewed. Constitutional:       Appearance: He is well-developed. HENT:      Head: Normocephalic. Right Ear: External ear normal.      Left Ear: External ear normal.      Nose: Nose normal.   Eyes:      Conjunctiva/sclera: Conjunctivae normal.      Pupils: Pupils are equal, round, and reactive to light. Cardiovascular:      Rate and Rhythm: Normal rate. Pulmonary:      Breath sounds: Normal breath sounds. Abdominal:      General: Bowel sounds are normal.      Palpations: Abdomen is soft. Musculoskeletal:         General: Normal range of motion. Cervical back: Normal range of motion and neck supple. Skin:     General: Skin is warm and dry. Neurological:      Mental Status: He is alert and oriented to person, place, and time. Psychiatric:         Behavior: Behavior normal.      Vitals physical examination all stable as noted. Labs reviewed and stable. Fit test provided. Follow-up visit 6 months sooner if problems. EKG today normal sinus rhythm no acute abnormalities UA was negative.       Lab Results   Component Value Date    TSH 1.42 09/02/2022    TSH 1.18 03/04/2022    E3COEIT 6.1 09/02/2022    A6DYJNW 7.0 03/04/2022     Lab Results   Component Value Date    CHOL 236 09/02/2022    CHOL 223 03/04/2022     Lab Results   Component Value Date    TRIG 236 09/02/2022    TRIG 178 03/04/2022     Lab Results   Component Value Date    HDL 67 09/02/2022    HDL 66 03/04/2022     No results found for: [facility]  Lab Results   Component Value Date    VLDL 149 05/09/2019     Lab Results   Component Value Date    CHOLHDLRATIO 3.5 09/02/2022    CHOLHDLRATIO 3.4 03/04/2022     Lab Results   Component Value Date    WBC 5.6 09/02/2022    HGB 15.3 09/02/2022    HCT 43.4 09/02/2022    MCV 94.3 09/02/2022     09/02/2022    LYMPHOPCT 39.5 09/02/2022    RBC 4.60 09/02/2022    MCH 33.3 09/02/2022    MCHC 35.3 09/02/2022    RDW 12.8 09/02/2022     Lab Results   Component Value Date     09/02/2022    K 4.2 09/02/2022     09/02/2022    CO2 27 09/02/2022    BUN 17 09/02/2022    CREATININE 0.80 09/02/2022    GLUCOSE 111 09/02/2022    CALCIUM 9.4 09/02/2022    PROT 7.2 09/21/2016    LABALBU 4.5 09/02/2022    BILITOT 0.8 09/02/2022    ALKPHOS 48 09/02/2022    AST 22 09/02/2022    ALT 25 09/02/2022    LABGLOM 93 09/02/2022    GFRAA >60 09/21/2016        Lab Results   Component Value Date    PSA 0.91 03/04/2022    PSA 0.8 05/21/2021    PSA 0.8 12/02/2019      Lab Results   Component Value Date    LABA1C 5.4 09/02/2022    LABA1C 5.4 03/04/2022    LABA1C 5.2 08/31/2021     No results found for: EAG      Plan Per Assessment:  Mony Harris was seen today for annual exam, discuss labs and other. Diagnoses and all orders for this visit:    Annual physical exam  -     EKG 12 lead; Future  -     EKG 12 lead  -     POCT Urinalysis no Micro    Impaired fasting glucose    Mixed hyperlipidemia    Chronic midline low back pain without sciatica  -     External Referral To Physical Therapy          No follow-ups on file. Lucas Baugh DO    Note was generated with the assistance of voice recognition software. Document was reviewed however may contain grammatical errors.

## 2022-10-12 PROBLEM — Z00.00 ANNUAL PHYSICAL EXAM: Status: RESOLVED | Noted: 2019-12-09 | Resolved: 2022-10-12

## 2022-11-17 ENCOUNTER — OFFICE VISIT (OUTPATIENT)
Dept: FAMILY MEDICINE CLINIC | Age: 77
End: 2022-11-17
Payer: MEDICARE

## 2022-11-17 VITALS
HEIGHT: 68 IN | DIASTOLIC BLOOD PRESSURE: 76 MMHG | WEIGHT: 193 LBS | SYSTOLIC BLOOD PRESSURE: 136 MMHG | HEART RATE: 90 BPM | TEMPERATURE: 97.9 F | OXYGEN SATURATION: 97 % | BODY MASS INDEX: 29.25 KG/M2

## 2022-11-17 DIAGNOSIS — R05.1 ACUTE COUGH: Primary | ICD-10-CM

## 2022-11-17 DIAGNOSIS — J01.10 ACUTE NON-RECURRENT FRONTAL SINUSITIS: ICD-10-CM

## 2022-11-17 PROCEDURE — 1123F ACP DISCUSS/DSCN MKR DOCD: CPT | Performed by: FAMILY MEDICINE

## 2022-11-17 PROCEDURE — 99213 OFFICE O/P EST LOW 20 MIN: CPT | Performed by: FAMILY MEDICINE

## 2022-11-17 RX ORDER — DOXYCYCLINE HYCLATE 100 MG/1
100 CAPSULE ORAL 2 TIMES DAILY
Qty: 20 CAPSULE | Refills: 0 | Status: SHIPPED | OUTPATIENT
Start: 2022-11-17 | End: 2022-11-27

## 2022-11-17 RX ORDER — PREDNISONE 1 MG/1
TABLET ORAL
Qty: 30 TABLET | Refills: 0 | Status: SHIPPED | OUTPATIENT
Start: 2022-11-17

## 2022-11-17 SDOH — ECONOMIC STABILITY: FOOD INSECURITY: WITHIN THE PAST 12 MONTHS, YOU WORRIED THAT YOUR FOOD WOULD RUN OUT BEFORE YOU GOT MONEY TO BUY MORE.: NEVER TRUE

## 2022-11-17 SDOH — ECONOMIC STABILITY: FOOD INSECURITY: WITHIN THE PAST 12 MONTHS, THE FOOD YOU BOUGHT JUST DIDN'T LAST AND YOU DIDN'T HAVE MONEY TO GET MORE.: NEVER TRUE

## 2022-11-17 ASSESSMENT — SOCIAL DETERMINANTS OF HEALTH (SDOH): HOW HARD IS IT FOR YOU TO PAY FOR THE VERY BASICS LIKE FOOD, HOUSING, MEDICAL CARE, AND HEATING?: NOT HARD AT ALL

## 2022-11-17 NOTE — PROGRESS NOTES
22     Chetan Steward    : 1945 Sex: male   Age: 78 y.o. Chief Complaint   Patient presents with    Congestion     Was out in the 28 degree weather at a wedding. Covid home test neg. Cough       Prior to Admission medications    Medication Sig Start Date End Date Taking?  Authorizing Provider   doxycycline hyclate (VIBRAMYCIN) 100 MG capsule Take 1 capsule by mouth 2 times daily for 10 days 22 Yes Alli Fernandez DO   predniSONE (DELTASONE) 5 MG tablet 4 tablets daily for 3 days then 3 tablets daily for 3 days then 2 tablets daily for 3 days then 1 tablet daily for 3 days 22  Yes Alli Fernandez DO   valACYclovir (VALTREX) 500 MG tablet daily 22  Yes Historical Provider, MD   famotidine (PEPCID) 20 MG tablet TAKE 1 TABLET DAILY 3/10/22  Yes Alli Fernandez DO   fluticasone (FLONASE) 50 MCG/ACT nasal spray USE 2 SPRAYS NASALLY DAILY 3/10/22  Yes Alli Fernandez DO   montelukast (SINGULAIR) 10 MG tablet TAKE 1 TABLET NIGHTLY 3/10/22  Yes Alli Fernandez DO   pravastatin (PRAVACHOL) 40 MG tablet 1 qd 3/10/22  Yes Alli Fernandez DO   sildenafil (VIAGRA) 100 MG tablet Take 1 tablet by mouth daily as needed for Erectile Dysfunction 21  Yes Alli Fernandez DO   Clobetasol Propionate 0.05 % SHAM Qd prn 19  Yes Alli Fernandez DO   vitamin E 400 UNIT capsule Take 400 Units by mouth daily   Yes Historical Provider, MD   aspirin 81 MG tablet Take 81 mg by mouth daily   Yes Historical Provider, MD   sildenafil (REVATIO) 20 MG tablet Take 1 tablet by mouth daily as needed (as needed) 5/15/19  Yes Alli Fernandez DO   Coenzyme Q10 (CO Q-10) 200 MG CAPS Take 200 mg by mouth daily    Yes Historical Provider, MD   L-Lysine HCl 1000 MG TABS Take 1,000 mg by mouth daily    Yes Historical Provider, MD   Multiple Vitamins-Minerals (THERAPEUTIC MULTIVITAMIN-MINERALS) tablet Take 1 tablet by mouth daily   Yes Historical Provider, MD   Ascorbic Acid (VITAMIN C) 250 MG tablet Take 500 mg by mouth daily   Yes Historical Provider, MD   Saw Nadia, Serenoa repens, 1000 MG CAPS Take 1,000 mg by mouth daily    Yes Historical Provider, MD          HPI: Evaluated today upper respiratory cough congestion sinus drainage. Denies fever chills. Onset this past weekend. COVID testing at home this morning was negative. Fully vaccinated. Clinically otherwise stable. Review of Systems   Constitutional: Negative. HENT: Negative. Eyes: Negative. Respiratory: Negative. Gastrointestinal: Negative. Endocrine: Negative. Genitourinary: Negative. Musculoskeletal: Negative. Skin: Negative. Allergic/Immunologic: Negative. Neurological: Negative. Hematological: Negative. Psychiatric/Behavioral: Negative.               Current Outpatient Medications:     doxycycline hyclate (VIBRAMYCIN) 100 MG capsule, Take 1 capsule by mouth 2 times daily for 10 days, Disp: 20 capsule, Rfl: 0    predniSONE (DELTASONE) 5 MG tablet, 4 tablets daily for 3 days then 3 tablets daily for 3 days then 2 tablets daily for 3 days then 1 tablet daily for 3 days, Disp: 30 tablet, Rfl: 0    valACYclovir (VALTREX) 500 MG tablet, daily, Disp: , Rfl:     famotidine (PEPCID) 20 MG tablet, TAKE 1 TABLET DAILY, Disp: 90 tablet, Rfl: 3    fluticasone (FLONASE) 50 MCG/ACT nasal spray, USE 2 SPRAYS NASALLY DAILY, Disp: 48 g, Rfl: 3    montelukast (SINGULAIR) 10 MG tablet, TAKE 1 TABLET NIGHTLY, Disp: 90 tablet, Rfl: 3    pravastatin (PRAVACHOL) 40 MG tablet, 1 qd, Disp: 90 tablet, Rfl: 3    sildenafil (VIAGRA) 100 MG tablet, Take 1 tablet by mouth daily as needed for Erectile Dysfunction, Disp: 30 tablet, Rfl: 3    Clobetasol Propionate 0.05 % SHAM, Qd prn, Disp: 118 mL, Rfl: 1    vitamin E 400 UNIT capsule, Take 400 Units by mouth daily, Disp: , Rfl:     aspirin 81 MG tablet, Take 81 mg by mouth daily, Disp: , Rfl:     sildenafil (REVATIO) 20 MG tablet, Take 1 tablet by mouth daily as needed (as needed), Disp: 30 tablet, Rfl: 3    Coenzyme Q10 (CO Q-10) 200 MG CAPS, Take 200 mg by mouth daily , Disp: , Rfl:     L-Lysine HCl 1000 MG TABS, Take 1,000 mg by mouth daily , Disp: , Rfl:     Multiple Vitamins-Minerals (THERAPEUTIC MULTIVITAMIN-MINERALS) tablet, Take 1 tablet by mouth daily, Disp: , Rfl:     Ascorbic Acid (VITAMIN C) 250 MG tablet, Take 500 mg by mouth daily, Disp: , Rfl:     Saw Palmetto, Serenoa repens, 1000 MG CAPS, Take 1,000 mg by mouth daily , Disp: , Rfl:     Allergies   Allergen Reactions    Atorvastatin     Flomax [Tamsulosin Hcl]     Metformin And Related Other (See Comments)     Loss of memory    Penicillins     Silodosin        Social History     Tobacco Use    Smoking status: Never    Smokeless tobacco: Never   Substance Use Topics    Alcohol use: Yes     Comment: social    Drug use: No      Past Surgical History:   Procedure Laterality Date    COLONOSCOPY      KNEE SURGERY Right ~6/2015    TENDON RELEASE Right 2005    right hand    VARICOCELECTOMY  1980s    abd. Family History   Problem Relation Age of Onset    Diabetes Mother     Heart Disease Mother     Stroke Mother     Other Brother      Past Medical History:   Diagnosis Date    Hyperlipidemia     Pleurisy     Vertigo 06/2008       Vitals:    11/17/22 1055   BP: 136/76   Pulse: 90   Temp: 97.9 °F (36.6 °C)   SpO2: 97%   Weight: 193 lb (87.5 kg)   Height: 5' 8\" (1.727 m)     BP Readings from Last 3 Encounters:   11/17/22 136/76   09/12/22 130/72   03/10/22 120/78        Physical Exam  Vitals and nursing note reviewed. Constitutional:       Appearance: He is well-developed. HENT:      Head: Normocephalic. Right Ear: External ear normal.      Left Ear: External ear normal.      Nose: Nose normal.   Eyes:      Conjunctiva/sclera: Conjunctivae normal.      Pupils: Pupils are equal, round, and reactive to light. Cardiovascular:      Rate and Rhythm: Normal rate.    Pulmonary:      Breath sounds: Normal breath sounds. Abdominal:      General: Bowel sounds are normal.      Palpations: Abdomen is soft. Musculoskeletal:         General: Normal range of motion. Cervical back: Normal range of motion and neck supple. Skin:     General: Skin is warm and dry. Neurological:      Mental Status: He is alert and oriented to person, place, and time. Psychiatric:         Behavior: Behavior normal.      Today's vitals physical examination stable. We will sit tight with current meds and care. Reassessment March and sooner if problems. Plan Per Assessment:  Rody Sunshine was seen today for congestion and cough. Diagnoses and all orders for this visit:    Acute cough    Acute non-recurrent frontal sinusitis    Other orders  -     doxycycline hyclate (VIBRAMYCIN) 100 MG capsule; Take 1 capsule by mouth 2 times daily for 10 days  -     predniSONE (DELTASONE) 5 MG tablet; 4 tablets daily for 3 days then 3 tablets daily for 3 days then 2 tablets daily for 3 days then 1 tablet daily for 3 days          No follow-ups on file. Donald Dickinson DO    Note was generated with the assistance of voice recognition software. Document was reviewed however may contain grammatical errors.

## 2022-12-10 ENCOUNTER — OFFICE VISIT (OUTPATIENT)
Dept: FAMILY MEDICINE CLINIC | Age: 77
End: 2022-12-10

## 2022-12-10 VITALS
TEMPERATURE: 97.5 F | DIASTOLIC BLOOD PRESSURE: 80 MMHG | OXYGEN SATURATION: 95 % | BODY MASS INDEX: 28.13 KG/M2 | WEIGHT: 185 LBS | SYSTOLIC BLOOD PRESSURE: 124 MMHG | HEART RATE: 69 BPM

## 2022-12-10 DIAGNOSIS — H10.33 ACUTE BACTERIAL CONJUNCTIVITIS OF BOTH EYES: Primary | ICD-10-CM

## 2022-12-10 RX ORDER — NEOMYCIN SULFATE, POLYMYXIN B SULFATE AND DEXAMETHASONE 3.5; 10000; 1 MG/ML; [USP'U]/ML; MG/ML
2 SUSPENSION/ DROPS OPHTHALMIC 2 TIMES DAILY
Qty: 5 ML | Refills: 0 | Status: SHIPPED | OUTPATIENT
Start: 2022-12-10 | End: 2022-12-17

## 2022-12-10 ASSESSMENT — ENCOUNTER SYMPTOMS
EYE ITCHING: 1
NAUSEA: 0
EYE DISCHARGE: 1
BACK PAIN: 0
CONSTIPATION: 0
SHORTNESS OF BREATH: 0
PHOTOPHOBIA: 0
SINUS PAIN: 0
VOMITING: 0
EYE REDNESS: 1
ABDOMINAL PAIN: 0
WHEEZING: 0
COUGH: 0
EYE PAIN: 1
SORE THROAT: 0
DIARRHEA: 0

## 2022-12-10 NOTE — PROGRESS NOTES
Benedicto Palafox (:  1945) is a 78 y.o. male,Established patient, here for evaluation of the following chief complaint(s): Conjunctivitis (Pain in right eye)         ASSESSMENT/PLAN:  1. Acute bacterial conjunctivitis of both eyes  Comments:  maxitrol bilaterally x 1 week  ifnot better see eye doctor    Return if symptoms worsen or fail to improve. Subjective   SUBJECTIVE/OBJECTIVE:  Patient here with 2 to 3 days right eye itching draiange and sore now left eye starting  Was n abx for sinus infection 2 weeks ago and that helped      Review of Systems   Constitutional:  Negative for appetite change, fatigue and fever. HENT:  Negative for congestion, ear pain, hearing loss, sinus pain and sore throat. Eyes:  Positive for pain, discharge, redness and itching. Negative for photophobia and visual disturbance. Respiratory:  Negative for cough, shortness of breath and wheezing. Cardiovascular:  Negative for chest pain and leg swelling. Gastrointestinal:  Negative for abdominal pain, constipation, diarrhea, nausea and vomiting. Endocrine: Negative for cold intolerance and heat intolerance. Genitourinary:  Negative for difficulty urinating, hematuria, scrotal swelling, testicular pain and urgency. Musculoskeletal:  Negative for arthralgias, back pain, joint swelling and myalgias. Skin:  Negative for rash and wound. Neurological:  Negative for dizziness, syncope and light-headedness. Hematological:  Negative for adenopathy. Psychiatric/Behavioral:  Negative for confusion and sleep disturbance. The patient is not nervous/anxious. Objective   Physical Exam  Vitals and nursing note reviewed. Constitutional:       General: He is not in acute distress. Appearance: He is well-developed. He is not toxic-appearing. HENT:      Head: Normocephalic and atraumatic. Nose: No congestion. Eyes:      Pupils: Pupils are equal, round, and reactive to light.       Comments: Right eye conjuctiva red, mild crusting in corner, no styes   Cardiovascular:      Rate and Rhythm: Normal rate and regular rhythm. Pulmonary:      Effort: Pulmonary effort is normal.      Breath sounds: Normal breath sounds. Musculoskeletal:      Cervical back: Normal range of motion. Skin:     General: Skin is warm and dry. Neurological:      Mental Status: He is alert. An electronic signature was used to authenticate this note.     --Vikki Burgos DO

## 2023-01-03 DIAGNOSIS — R73.01 IMPAIRED FASTING GLUCOSE: Primary | ICD-10-CM

## 2023-01-03 DIAGNOSIS — E78.2 MIXED HYPERLIPIDEMIA: ICD-10-CM

## 2023-01-03 DIAGNOSIS — D69.6 THROMBOCYTOPENIA, UNSPECIFIED (HCC): ICD-10-CM

## 2023-01-03 DIAGNOSIS — E11.9 TYPE 2 DIABETES MELLITUS WITHOUT COMPLICATION, WITHOUT LONG-TERM CURRENT USE OF INSULIN (HCC): ICD-10-CM

## 2023-03-08 RX ORDER — FAMOTIDINE 20 MG/1
TABLET, FILM COATED ORAL
Qty: 90 TABLET | Refills: 3 | Status: SHIPPED | OUTPATIENT
Start: 2023-03-08

## 2023-03-08 RX ORDER — PRAVASTATIN SODIUM 40 MG
TABLET ORAL
Qty: 90 TABLET | Refills: 3 | Status: SHIPPED | OUTPATIENT
Start: 2023-03-08

## 2023-03-10 LAB
ALBUMIN SERPL-MCNC: 4.5 G/DL
ALP BLD-CCNC: 50 U/L
ALT SERPL-CCNC: 24 U/L
ANION GAP SERPL CALCULATED.3IONS-SCNC: NORMAL MMOL/L
AST SERPL-CCNC: 26 U/L
AVERAGE GLUCOSE: NORMAL
BASOPHILS ABSOLUTE: 52 /ΜL
BASOPHILS RELATIVE PERCENT: 1.3 %
BILIRUB SERPL-MCNC: 0.9 MG/DL (ref 0.1–1.4)
BUN BLDV-MCNC: 11 MG/DL
CALCIUM SERPL-MCNC: 9.4 MG/DL
CHLORIDE BLD-SCNC: 103 MMOL/L
CHOLESTEROL, TOTAL: 179 MG/DL
CHOLESTEROL/HDL RATIO: 2.9
CO2: 27 MMOL/L
CREAT SERPL-MCNC: 0.76 MG/DL
EGFR: 94
EOSINOPHILS ABSOLUTE: 100 /ΜL
EOSINOPHILS RELATIVE PERCENT: 2.5 %
GLUCOSE BLD-MCNC: 105 MG/DL
HBA1C MFR BLD: 5 %
HCT VFR BLD CALC: 44 % (ref 41–53)
HDLC SERPL-MCNC: 61 MG/DL (ref 35–70)
HEMOGLOBIN: 15.6 G/DL (ref 13.5–17.5)
LDL CHOLESTEROL CALCULATED: 100 MG/DL (ref 0–160)
LYMPHOCYTES ABSOLUTE: 1640 /ΜL
LYMPHOCYTES RELATIVE PERCENT: 41 %
MCH RBC QN AUTO: 33.2 PG
MCHC RBC AUTO-ENTMCNC: 35.5 G/DL
MCV RBC AUTO: 93.6 FL
MONOCYTES ABSOLUTE: 524 /ΜL
MONOCYTES RELATIVE PERCENT: 13.1 %
NEUTROPHILS ABSOLUTE: 1684 /ΜL
NEUTROPHILS RELATIVE PERCENT: 42.1 %
NONHDLC SERPL-MCNC: 118 MG/DL
PDW BLD-RTO: NORMAL %
PLATELET # BLD: 133 K/ΜL
PMV BLD AUTO: 11.8 FL
POTASSIUM SERPL-SCNC: 4.5 MMOL/L
RBC # BLD: 4.7 10^6/ΜL
SODIUM BLD-SCNC: 139 MMOL/L
TOTAL PROTEIN: 6.8
TRIGL SERPL-MCNC: 87 MG/DL
TSH SERPL DL<=0.05 MIU/L-ACNC: 1.34 UIU/ML
VLDLC SERPL CALC-MCNC: NORMAL MG/DL
WBC # BLD: 4 10^3/ML

## 2023-03-13 DIAGNOSIS — R73.01 IMPAIRED FASTING GLUCOSE: ICD-10-CM

## 2023-03-13 DIAGNOSIS — E11.9 TYPE 2 DIABETES MELLITUS WITHOUT COMPLICATION, WITHOUT LONG-TERM CURRENT USE OF INSULIN (HCC): ICD-10-CM

## 2023-03-13 DIAGNOSIS — D69.6 THROMBOCYTOPENIA, UNSPECIFIED (HCC): ICD-10-CM

## 2023-03-13 DIAGNOSIS — E78.2 MIXED HYPERLIPIDEMIA: ICD-10-CM

## 2023-03-20 ENCOUNTER — OFFICE VISIT (OUTPATIENT)
Dept: PRIMARY CARE CLINIC | Age: 78
End: 2023-03-20
Payer: MEDICARE

## 2023-03-20 ENCOUNTER — OFFICE VISIT (OUTPATIENT)
Dept: PRIMARY CARE CLINIC | Age: 78
End: 2023-03-20

## 2023-03-20 VITALS
HEART RATE: 72 BPM | TEMPERATURE: 97.3 F | BODY MASS INDEX: 25.76 KG/M2 | HEIGHT: 68 IN | OXYGEN SATURATION: 96 % | DIASTOLIC BLOOD PRESSURE: 80 MMHG | SYSTOLIC BLOOD PRESSURE: 124 MMHG | WEIGHT: 170 LBS

## 2023-03-20 DIAGNOSIS — R73.01 IMPAIRED FASTING GLUCOSE: Primary | ICD-10-CM

## 2023-03-20 DIAGNOSIS — E78.2 MIXED HYPERLIPIDEMIA: Chronic | ICD-10-CM

## 2023-03-20 DIAGNOSIS — J30.1 SEASONAL ALLERGIC RHINITIS DUE TO POLLEN: ICD-10-CM

## 2023-03-20 DIAGNOSIS — Z12.5 PROSTATE CANCER SCREENING: ICD-10-CM

## 2023-03-20 DIAGNOSIS — Z00.00 MEDICARE ANNUAL WELLNESS VISIT, SUBSEQUENT: Primary | ICD-10-CM

## 2023-03-20 DIAGNOSIS — D69.6 THROMBOCYTOPENIA, UNSPECIFIED (HCC): ICD-10-CM

## 2023-03-20 PROCEDURE — 1123F ACP DISCUSS/DSCN MKR DOCD: CPT | Performed by: FAMILY MEDICINE

## 2023-03-20 PROCEDURE — G0439 PPPS, SUBSEQ VISIT: HCPCS | Performed by: FAMILY MEDICINE

## 2023-03-20 RX ORDER — CLOBETASOL PROPIONATE 0.05 G/100ML
SHAMPOO TOPICAL
Qty: 118 ML | Refills: 1 | Status: SHIPPED | OUTPATIENT
Start: 2023-03-20

## 2023-03-20 RX ORDER — FLUTICASONE PROPIONATE 50 MCG
SPRAY, SUSPENSION (ML) NASAL
Qty: 48 G | Refills: 3 | Status: SHIPPED | OUTPATIENT
Start: 2023-03-20

## 2023-03-20 SDOH — ECONOMIC STABILITY: HOUSING INSECURITY
IN THE LAST 12 MONTHS, WAS THERE A TIME WHEN YOU DID NOT HAVE A STEADY PLACE TO SLEEP OR SLEPT IN A SHELTER (INCLUDING NOW)?: PATIENT REFUSED

## 2023-03-20 SDOH — ECONOMIC STABILITY: FOOD INSECURITY: WITHIN THE PAST 12 MONTHS, YOU WORRIED THAT YOUR FOOD WOULD RUN OUT BEFORE YOU GOT MONEY TO BUY MORE.: PATIENT DECLINED

## 2023-03-20 SDOH — ECONOMIC STABILITY: FOOD INSECURITY: WITHIN THE PAST 12 MONTHS, THE FOOD YOU BOUGHT JUST DIDN'T LAST AND YOU DIDN'T HAVE MONEY TO GET MORE.: PATIENT DECLINED

## 2023-03-20 SDOH — ECONOMIC STABILITY: INCOME INSECURITY: HOW HARD IS IT FOR YOU TO PAY FOR THE VERY BASICS LIKE FOOD, HOUSING, MEDICAL CARE, AND HEATING?: PATIENT DECLINED

## 2023-03-20 ASSESSMENT — LIFESTYLE VARIABLES
HOW OFTEN DO YOU HAVE A DRINK CONTAINING ALCOHOL: 2-3 TIMES A WEEK
HOW MANY STANDARD DRINKS CONTAINING ALCOHOL DO YOU HAVE ON A TYPICAL DAY: 1 OR 2

## 2023-03-20 ASSESSMENT — PATIENT HEALTH QUESTIONNAIRE - PHQ9
SUM OF ALL RESPONSES TO PHQ QUESTIONS 1-9: 0
SUM OF ALL RESPONSES TO PHQ QUESTIONS 1-9: 0
SUM OF ALL RESPONSES TO PHQ9 QUESTIONS 1 & 2: 0
2. FEELING DOWN, DEPRESSED OR HOPELESS: 0
SUM OF ALL RESPONSES TO PHQ QUESTIONS 1-9: 0
1. LITTLE INTEREST OR PLEASURE IN DOING THINGS: 0
SUM OF ALL RESPONSES TO PHQ QUESTIONS 1-9: 0

## 2023-03-20 ASSESSMENT — ENCOUNTER SYMPTOMS
RESPIRATORY NEGATIVE: 1
ALLERGIC/IMMUNOLOGIC NEGATIVE: 1
EYES NEGATIVE: 1
GASTROINTESTINAL NEGATIVE: 1

## 2023-03-20 NOTE — PROGRESS NOTES
visit:    Impaired fasting glucose  -     CBC with Auto Differential; Future  -     Comprehensive Metabolic Panel; Future  -     Lipid Panel; Future  -     T4; Future  -     TSH; Future  -     PSA Screening; Future    Thrombocytopenia, unspecified  -     CBC with Auto Differential; Future  -     Comprehensive Metabolic Panel; Future  -     Lipid Panel; Future  -     T4; Future  -     TSH; Future  -     PSA Screening; Future    Seasonal allergic rhinitis due to pollen    Mixed hyperlipidemia  -     CBC with Auto Differential; Future  -     Comprehensive Metabolic Panel; Future  -     Lipid Panel; Future  -     T4; Future  -     TSH; Future  -     PSA Screening; Future    Prostate cancer screening  -     PSA Screening; Future    Other orders  -     fluticasone (FLONASE) 50 MCG/ACT nasal spray; USE 2 SPRAYS NASALLY DAILY  -     Clobetasol Propionate 0.05 % SHAM; Qd prn          Return in about 6 months (around 9/20/2023) for phys. Sonny Henson,     Note was generated with the assistance of voice recognition software. Document was reviewed however may contain grammatical errors.

## 2023-03-20 NOTE — PROGRESS NOTES
Medicare Annual Wellness Visit    Bernadine Munoz is here for Medicare AWV    Assessment & Plan   Medicare annual wellness visit, subsequent    Recommendations for Preventive Services Due: see orders and patient instructions/AVS.  Recommended screening schedule for the next 5-10 years is provided to the patient in written form: see Patient Instructions/AVS.     Return for Medicare Annual Wellness Visit in 1 year. Subjective       Patient's complete Health Risk Assessment and screening values have been reviewed and are found in Flowsheets. The following problems were reviewed today and where indicated follow up appointments were made and/or referrals ordered. Positive Risk Factor Screenings with Interventions:    Fall Risk:  Do you feel unsteady or are you worried about falling? : no  2 or more falls in past year?: no  Fall with injury in past year?: (!) yes     Interventions:    Appropriate precautions. Recent fall was accidental.                 Vision Screen:  Do you have difficulty driving, watching TV, or doing any of your daily activities because of your eyesight?: (!) Yes  Have you had an eye exam within the past year?: Yes  No results found. Interventions: Follows with ophthalmology. Objective   There were no vitals filed for this visit. There is no height or weight on file to calculate BMI. Allergies   Allergen Reactions    Atorvastatin     Flomax [Tamsulosin Hcl]     Metformin And Related Other (See Comments)     Loss of memory    Penicillins     Silodosin      Prior to Visit Medications    Medication Sig Taking?  Authorizing Provider   fluticasone (FLONASE) 50 MCG/ACT nasal spray USE 2 SPRAYS NASALLY DAILY  Denise Orn Traikoff, DO   Clobetasol Propionate 0.05 % SHAM Qd prn  Denise Orn Traikoff, DO   famotidine (PEPCID) 20 MG tablet TAKE 1 TABLET DAILY  Denise Orn Traikoff, DO   pravastatin (PRAVACHOL) 40 MG tablet TAKE 1 TABLET DAILY  Alvarez Pelaez, DO

## 2023-03-20 NOTE — PATIENT INSTRUCTIONS
suggests. They can show whether your hearing has changed. Your hearing aid may need to be adjusted. Use other devices as needed. These may include:  Telephone amplifiers and hearing aids that can connect to a television, stereo, radio, or microphone. Devices that use lights or vibrations. These alert you to the doorbell, a ringing telephone, or a baby monitor. Television closed-captioning. This shows the words at the bottom of the screen. Most new TVs can do this. TTY (text telephone). This lets you type messages back and forth on the telephone instead of talking or listening. These devices are also called TDD. When messages are typed on the keyboard, they are sent over the phone line to a receiving TTY. The message is shown on a monitor. Use text messaging, social media, and email if it is hard for you to communicate by telephone. Try to learn a listening technique called speechreading. It is not lipreading. You pay attention to people's gestures, expressions, posture, and tone of voice. These clues can help you understand what a person is saying. Face the person you are talking to, and have them face you. Make sure the lighting is good. You need to see the other person's face clearly. Think about counseling if you need help to adjust to your hearing loss. When should you call for help? Watch closely for changes in your health, and be sure to contact your doctor if:    You think your hearing is getting worse.     You have new symptoms, such as dizziness or nausea. Where can you learn more? Go to http://www.[employer].com/ and enter R798 to learn more about \"Hearing Loss: Care Instructions. \"  Current as of: May 4, 2022               Content Version: 13.6  © 0887-9844 [employer]wise, Incorporated. Care instructions adapted under license by [facility] ([facility]).  If you have questions about a medical condition or this instruction, always ask your healthcare professional. [facility]

## 2023-03-28 RX ORDER — MONTELUKAST SODIUM 10 MG/1
TABLET ORAL
Qty: 90 TABLET | Refills: 3 | Status: SHIPPED | OUTPATIENT
Start: 2023-03-28

## 2023-08-29 ENCOUNTER — OFFICE VISIT (OUTPATIENT)
Dept: FAMILY MEDICINE CLINIC | Age: 78
End: 2023-08-29
Payer: MEDICARE

## 2023-08-29 VITALS
OXYGEN SATURATION: 95 % | HEART RATE: 69 BPM | WEIGHT: 186.2 LBS | BODY MASS INDEX: 28.31 KG/M2 | TEMPERATURE: 97.5 F | DIASTOLIC BLOOD PRESSURE: 82 MMHG | SYSTOLIC BLOOD PRESSURE: 122 MMHG

## 2023-08-29 DIAGNOSIS — J01.90 ACUTE NON-RECURRENT SINUSITIS, UNSPECIFIED LOCATION: Primary | ICD-10-CM

## 2023-08-29 DIAGNOSIS — R09.81 NASAL CONGESTION: ICD-10-CM

## 2023-08-29 DIAGNOSIS — R09.82 POSTNASAL DRIP: ICD-10-CM

## 2023-08-29 PROCEDURE — 1123F ACP DISCUSS/DSCN MKR DOCD: CPT | Performed by: PHYSICIAN ASSISTANT

## 2023-08-29 PROCEDURE — 99203 OFFICE O/P NEW LOW 30 MIN: CPT | Performed by: PHYSICIAN ASSISTANT

## 2023-08-29 RX ORDER — DOXYCYCLINE HYCLATE 100 MG
100 TABLET ORAL 2 TIMES DAILY
Qty: 20 TABLET | Refills: 0 | Status: SHIPPED | OUTPATIENT
Start: 2023-08-29 | End: 2023-09-08

## 2023-08-29 NOTE — PROGRESS NOTES
23  Diane Nova : 1945 Sex: male  Age 78 y.o. Subjective:  Chief Complaint   Patient presents with    Pharyngitis     Started 3 days ago, declines testing    Congestion    Drainage    Sinusitis         HPI:   Diane Nova , 78 y.o. male presents to express care for evaluation of sore throat, congestion, drainage, sinus    HPI  79-year-old male presents to express care for evaluation of sinus congestion, drainage. The patient started with the symptoms about a week ago with the congestion, drainage in the last 3 days he has had worsening sore throat. The patient denies any fever, chills. The patient states that he has a propensity for developing sinus infections and this is pretty classic for him. The patient is not having any chest pain, shortness of breath. ROS:   Unless otherwise stated in this report the patient's positive and negative responses for review of systems for constitutional, eyes, ENT, cardiovascular, respiratory, gastrointestinal, neurological, , musculoskeletal, and integument systems and related systems to the presenting problem are either stated in the history of present illness or were not pertinent or were negative for the symptoms and/or complaints related to the presenting medical problem. Positives and pertinent negatives as per HPI. All others reviewed and are negative. PMH:     Past Medical History:   Diagnosis Date    Hyperlipidemia     Pleurisy     Vertigo 2008       Past Surgical History:   Procedure Laterality Date    COLONOSCOPY      KNEE SURGERY Right ~2015    TENDON RELEASE Right 2005    right hand    VARICOCELECTOMY  1980s    abd.        Family History   Problem Relation Age of Onset    Diabetes Mother     Heart Disease Mother     Stroke Mother     Other Brother        Medications:     Current Outpatient Medications:     doxycycline hyclate (VIBRA-TABS) 100 MG tablet, Take 1 tablet by mouth 2 times daily for 10 days, Disp: 20 tablet,

## 2023-09-20 LAB
ALBUMIN SERPL-MCNC: 4.2 G/DL
ALP BLD-CCNC: 49 U/L
ALT SERPL-CCNC: 25 U/L
ANION GAP SERPL CALCULATED.3IONS-SCNC: ABNORMAL MMOL/L
AST SERPL-CCNC: 22 U/L
BASOPHILS ABSOLUTE: 38 /ΜL
BASOPHILS RELATIVE PERCENT: 0.7 %
BILIRUB SERPL-MCNC: 0.7 MG/DL (ref 0.1–1.4)
BUN BLDV-MCNC: 22 MG/DL
CALCIUM SERPL-MCNC: 9 MG/DL
CHLORIDE BLD-SCNC: 104 MMOL/L
CHOLESTEROL, TOTAL: 204 MG/DL
CHOLESTEROL/HDL RATIO: 2.9
CO2: 29 MMOL/L
CREAT SERPL-MCNC: 0.88 MG/DL
EGFR: 90
EOSINOPHILS ABSOLUTE: 151 /ΜL
EOSINOPHILS RELATIVE PERCENT: 2.8 %
GLUCOSE BLD-MCNC: 110 MG/DL
HCT VFR BLD CALC: 42.5 % (ref 41–53)
HDLC SERPL-MCNC: 71 MG/DL (ref 35–70)
HEMOGLOBIN: 15 G/DL (ref 13.5–17.5)
LDL CHOLESTEROL CALCULATED: 112 MG/DL (ref 0–160)
LYMPHOCYTES ABSOLUTE: 2106 /ΜL
LYMPHOCYTES RELATIVE PERCENT: 39 %
MCH RBC QN AUTO: 32.8 PG
MCHC RBC AUTO-ENTMCNC: 35.3 G/DL
MCV RBC AUTO: 93 FL
MONOCYTES ABSOLUTE: 697 /ΜL
MONOCYTES RELATIVE PERCENT: 12.9 %
NEUTROPHILS ABSOLUTE: 2408 /ΜL
NEUTROPHILS RELATIVE PERCENT: 44.6 %
NONHDLC SERPL-MCNC: 133 MG/DL
PDW BLD-RTO: ABNORMAL %
PLATELET # BLD: 136 K/ΜL
PMV BLD AUTO: 11.1 FL
POTASSIUM SERPL-SCNC: 4.3 MMOL/L
PROSTATE SPECIFIC ANTIGEN: 1.05 NG/ML
RBC # BLD: 4.57 10^6/ΜL
SODIUM BLD-SCNC: 140 MMOL/L
T4 TOTAL: 6.4
TOTAL PROTEIN: 6.6
TRIGL SERPL-MCNC: 99 MG/DL
TSH SERPL DL<=0.05 MIU/L-ACNC: 1.74 UIU/ML
VLDLC SERPL CALC-MCNC: ABNORMAL MG/DL
WBC # BLD: 5.4 10^3/ML

## 2023-09-21 DIAGNOSIS — R73.01 IMPAIRED FASTING GLUCOSE: ICD-10-CM

## 2023-09-21 DIAGNOSIS — E78.2 MIXED HYPERLIPIDEMIA: Chronic | ICD-10-CM

## 2023-09-21 DIAGNOSIS — D69.6 THROMBOCYTOPENIA, UNSPECIFIED (HCC): ICD-10-CM

## 2023-09-21 DIAGNOSIS — Z12.5 PROSTATE CANCER SCREENING: ICD-10-CM

## 2023-09-27 ENCOUNTER — OFFICE VISIT (OUTPATIENT)
Dept: PRIMARY CARE CLINIC | Age: 78
End: 2023-09-27

## 2023-09-27 VITALS — SYSTOLIC BLOOD PRESSURE: 138 MMHG | DIASTOLIC BLOOD PRESSURE: 82 MMHG | BODY MASS INDEX: 28.59 KG/M2 | WEIGHT: 188 LBS

## 2023-09-27 DIAGNOSIS — E11.9 TYPE 2 DIABETES MELLITUS WITHOUT COMPLICATION, WITHOUT LONG-TERM CURRENT USE OF INSULIN (HCC): ICD-10-CM

## 2023-09-27 DIAGNOSIS — K21.9 GASTROESOPHAGEAL REFLUX DISEASE WITHOUT ESOPHAGITIS: Chronic | ICD-10-CM

## 2023-09-27 DIAGNOSIS — G89.29 CHRONIC PAIN OF LEFT UPPER EXTREMITY: ICD-10-CM

## 2023-09-27 DIAGNOSIS — M79.602 CHRONIC PAIN OF LEFT UPPER EXTREMITY: ICD-10-CM

## 2023-09-27 DIAGNOSIS — E78.00 PURE HYPERCHOLESTEROLEMIA: Chronic | ICD-10-CM

## 2023-09-27 DIAGNOSIS — Z00.00 ANNUAL PHYSICAL EXAM: Primary | ICD-10-CM

## 2023-09-27 LAB
BILIRUBIN, POC: NORMAL
BLOOD URINE, POC: NORMAL
CLARITY, POC: YELLOW
COLOR, POC: CLEAR
GLUCOSE URINE, POC: NORMAL
KETONES, POC: NORMAL
LEUKOCYTE EST, POC: NORMAL
NITRITE, POC: NORMAL
PH, POC: 7
PROTEIN, POC: NORMAL
SPECIFIC GRAVITY, POC: 1.01
UROBILINOGEN, POC: 0.2

## 2023-09-27 RX ORDER — DICLOFENAC POTASSIUM 50 MG/1
TABLET, FILM COATED ORAL
Qty: 60 TABLET | Refills: 1 | Status: SHIPPED | OUTPATIENT
Start: 2023-09-27

## 2023-09-27 ASSESSMENT — ENCOUNTER SYMPTOMS
RESPIRATORY NEGATIVE: 1
EYES NEGATIVE: 1
ALLERGIC/IMMUNOLOGIC NEGATIVE: 1
GASTROINTESTINAL NEGATIVE: 1

## 2023-09-27 NOTE — PROGRESS NOTES
23     Anthony Gautam    : 1945 Sex: male   Age: 78 y.o. Chief Complaint   Patient presents with    Annual Exam    Discuss Labs    bicep pain since fall last year        Prior to Admission medications    Medication Sig Start Date End Date Taking? Authorizing Provider   montelukast (SINGULAIR) 10 MG tablet TAKE 1 TABLET NIGHTLY 3/28/23  Yes Sheila Fernandez DO   fluticasone [facility]) 50 MCG/ACT nasal spray USE 2 SPRAYS NASALLY DAILY 3/20/23  Yes Sheila Fernandez DO   Clobetasol Propionate 0.05 % SHAM Qd prn 3/20/23  Yes Sheila Fernandez DO   famotidine (PEPCID) 20 MG tablet TAKE 1 TABLET DAILY 3/8/23  Yes Sheila Fernandez DO   pravastatin (PRAVACHOL) 40 MG tablet TAKE 1 TABLET DAILY 3/8/23  Yes Sheila Fernandez DO   sildenafil (VIAGRA) 100 MG tablet Take 1 tablet by mouth daily as needed for Erectile Dysfunction 21  Yes Sheila Fernandez DO   vitamin E 400 UNIT capsule Take 1 capsule by mouth daily   Yes Historical Provider, MD   aspirin 81 MG tablet Take 1 tablet by mouth daily   Yes Historical Provider, MD   sildenafil (REVATIO) 20 MG tablet Take 1 tablet by mouth daily as needed (as needed) 5/15/19  Yes Sheila Fernandez DO   Coenzyme Q10 (CO Q-10) 200 MG CAPS Take 1 capsule by mouth daily   Yes Historical Provider, MD   L-Lysine HCl 1000 MG TABS Take 1,000 mg by mouth daily    Yes Historical Provider, MD   Multiple Vitamins-Minerals (THERAPEUTIC MULTIVITAMIN-MINERALS) tablet Take 1 tablet by mouth daily   Yes Historical Provider, MD   Ascorbic Acid (VITAMIN C) 250 MG tablet Take 2 tablets by mouth daily   Yes Historical Provider, Wilmer Briceno repens, 1000 MG CAPS Take 1,000 mg by mouth daily    Yes Historical Provider, MD          HPI: Patient evaluated today complete physical.  Issues of glucose intolerance reflux disease hyperlipidemia all been very stable. Medications well-tolerated.   Blood sugars have been very well controlled with dietary measures and

## 2023-10-02 NOTE — TELEPHONE ENCOUNTER
Pharmacy calling due to diclofenac being ordered with diagnosis of gastritis and thrombocytopenia. Wanting to verify you want dispensed.    IPP#[id number]

## 2023-10-05 RX ORDER — DICLOFENAC POTASSIUM 50 MG/1
TABLET, FILM COATED ORAL
Qty: 180 TABLET | Refills: 0 | Status: SHIPPED | OUTPATIENT
Start: 2023-10-05

## 2023-10-17 ENCOUNTER — OFFICE VISIT (OUTPATIENT)
Dept: PRIMARY CARE CLINIC | Age: 78
End: 2023-10-17
Payer: MEDICARE

## 2023-10-17 VITALS
HEIGHT: 68 IN | BODY MASS INDEX: 29.25 KG/M2 | HEART RATE: 72 BPM | SYSTOLIC BLOOD PRESSURE: 138 MMHG | WEIGHT: 193 LBS | TEMPERATURE: 98.1 F | OXYGEN SATURATION: 97 % | DIASTOLIC BLOOD PRESSURE: 82 MMHG

## 2023-10-17 DIAGNOSIS — M79.602 ARM PAIN, ANTERIOR, LEFT: Primary | ICD-10-CM

## 2023-10-17 PROCEDURE — 1123F ACP DISCUSS/DSCN MKR DOCD: CPT | Performed by: FAMILY MEDICINE

## 2023-10-17 PROCEDURE — 99214 OFFICE O/P EST MOD 30 MIN: CPT | Performed by: FAMILY MEDICINE

## 2023-10-17 ASSESSMENT — ENCOUNTER SYMPTOMS
GASTROINTESTINAL NEGATIVE: 1
RESPIRATORY NEGATIVE: 1
ALLERGIC/IMMUNOLOGIC NEGATIVE: 1
EYES NEGATIVE: 1

## 2023-10-27 PROBLEM — Z00.00 ANNUAL PHYSICAL EXAM: Status: RESOLVED | Noted: 2019-12-09 | Resolved: 2023-10-27

## 2023-11-21 ENCOUNTER — OFFICE VISIT (OUTPATIENT)
Dept: PRIMARY CARE CLINIC | Age: 78
End: 2023-11-21
Payer: MEDICARE

## 2023-11-21 VITALS
HEART RATE: 76 BPM | SYSTOLIC BLOOD PRESSURE: 128 MMHG | BODY MASS INDEX: 29.25 KG/M2 | WEIGHT: 193 LBS | TEMPERATURE: 98.5 F | OXYGEN SATURATION: 96 % | DIASTOLIC BLOOD PRESSURE: 70 MMHG | HEIGHT: 68 IN

## 2023-11-21 DIAGNOSIS — M25.512 LEFT SHOULDER PAIN, UNSPECIFIED CHRONICITY: Primary | ICD-10-CM

## 2023-11-21 DIAGNOSIS — M54.9 DORSAL BACK PAIN: ICD-10-CM

## 2023-11-21 PROCEDURE — 1123F ACP DISCUSS/DSCN MKR DOCD: CPT | Performed by: FAMILY MEDICINE

## 2023-11-21 PROCEDURE — 99214 OFFICE O/P EST MOD 30 MIN: CPT | Performed by: FAMILY MEDICINE

## 2023-11-21 ASSESSMENT — ENCOUNTER SYMPTOMS
ALLERGIC/IMMUNOLOGIC NEGATIVE: 1
RESPIRATORY NEGATIVE: 1
EYES NEGATIVE: 1
BACK PAIN: 1
GASTROINTESTINAL NEGATIVE: 1

## 2023-11-21 NOTE — PROGRESS NOTES
23     Nabila Sharpe    : 1945 Sex: male   Age: 78 y.o. Chief Complaint   Patient presents with    Discuss Medications    Shoulder Pain    Joint Pain       Prior to Admission medications    Medication Sig Start Date End Date Taking? Authorizing Provider   montelukast (SINGULAIR) 10 MG tablet TAKE 1 TABLET NIGHTLY 3/28/23  Yes Jina Fernandez DO   fluticasone (FLONASE) 50 MCG/ACT nasal spray USE 2 SPRAYS NASALLY DAILY 3/20/23  Yes Edith Sauer, DO   Clobetasol Propionate 0.05 % SHAM Qd prn 3/20/23  Yes Jina Fernandez DO   famotidine (PEPCID) 20 MG tablet TAKE 1 TABLET DAILY 3/8/23  Yes Edith Sauer DO   pravastatin (PRAVACHOL) 40 MG tablet TAKE 1 TABLET DAILY 3/8/23  Yes Jina Fernandez DO   vitamin E 400 UNIT capsule Take 1 capsule by mouth daily   Yes ProviderCrista MD   aspirin 81 MG tablet Take 1 tablet by mouth daily   Yes Provider, MD Crista   sildenafil (REVATIO) 20 MG tablet Take 1 tablet by mouth daily as needed (as needed) 5/15/19  Yes Jina Fernandez DO   Coenzyme Q10 (CO Q-10) 200 MG CAPS Take 1 capsule by mouth daily   Yes Provider, MD Crista   Multiple Vitamins-Minerals (THERAPEUTIC MULTIVITAMIN-MINERALS) tablet Take 1 tablet by mouth daily   Yes Provider, MD Crista   Ascorbic Acid (VITAMIN C) 250 MG tablet Take 2 tablets by mouth daily   Yes Provider, MD Des Tobin, Serenoa repens, 1000 MG CAPS Take 1,000 mg by mouth daily    Yes ProviderCrista MD          HPI: Mihir Jennings seen today problems with continued left shoulder pain and mid back discomfort. He has done well with physical therapy in the past we are going to resume. Nonsteroidal Cataflam was bothersome to him with worsening joint pain so the medication has been discontinued. Remainder meds reviewed and continue as prescribed. Systems review otherwise stable. Review of Systems   Constitutional: Negative. HENT: Negative. Eyes: Negative.

## 2023-11-22 ENCOUNTER — TELEPHONE (OUTPATIENT)
Dept: FAMILY MEDICINE CLINIC | Age: 78
End: 2023-11-22

## 2023-11-22 DIAGNOSIS — M25.512 LEFT SHOULDER PAIN, UNSPECIFIED CHRONICITY: Primary | ICD-10-CM

## 2023-11-22 NOTE — TELEPHONE ENCOUNTER
Received call from [facility] at [facility] outpatient OT requesting an order for Occupational Therapy for pt's left shoulder.   Fax to [phone]

## 2023-12-11 NOTE — PROGRESS NOTES
Plan of Care established for skilled therapy services and certify that the services are required and that they will be provided while the patient is under my care. By co-signing this document as the referring physician, I demonstrate that I have reviewed the Plan of Care established for skilled therapy services and certify that the services are required and that they will be provided while the patient is under my care. If I have any comments or revisions to make to the POC, I will notify the evaluating therapist at the earliest convenience.

## 2023-12-12 ENCOUNTER — EVALUATION (OUTPATIENT)
Dept: OCCUPATIONAL THERAPY | Age: 78
End: 2023-12-12
Payer: MEDICARE

## 2023-12-12 DIAGNOSIS — M25.512 LEFT SHOULDER PAIN, UNSPECIFIED CHRONICITY: Primary | ICD-10-CM

## 2023-12-12 PROCEDURE — 97165 OT EVAL LOW COMPLEX 30 MIN: CPT | Performed by: OCCUPATIONAL THERAPIST

## 2023-12-12 PROCEDURE — 97110 THERAPEUTIC EXERCISES: CPT | Performed by: OCCUPATIONAL THERAPIST

## 2023-12-12 PROCEDURE — 97530 THERAPEUTIC ACTIVITIES: CPT | Performed by: OCCUPATIONAL THERAPIST

## 2023-12-14 ENCOUNTER — TREATMENT (OUTPATIENT)
Dept: OCCUPATIONAL THERAPY | Age: 78
End: 2023-12-14

## 2023-12-14 DIAGNOSIS — M25.512 LEFT SHOULDER PAIN, UNSPECIFIED CHRONICITY: Primary | ICD-10-CM

## 2023-12-14 NOTE — PROGRESS NOTES
OCCUPATIONAL THERAPY DAILY NOTE  [facility] OCCUPATIONAL THERAPY  [address] [facility] [address] [address] [place] [address]  Dept: [phone]  Loc: [facility] Fax: [phone]      Date:  2023    Initial Evaluation Date: 2023                          Evaluating Therapist: Jon Pace OT     Patient Name:  Margy Meredith                        :  1945     Restrictions/Precautions:  none, low fall risk  Diagnosis:  M25.512 (ICD-10-CM) - Left shoulder pain, unspecified chronicity   Possible impingement, and subscapularis injury                   Date of Surgery/Injury: ongoing     Insurance/Certification information:  [employer] Medicare  Plan of care signed (Y/N): N  Visit# / total visits: -     Referring Practitioner: Alberta Norris DO  Specific Practitioner Orders: Eval and treat    OT PLAN OF CARE   OT POC based on physician orders, patient diagnosis and results of clinical assessment     Frequency/Duration:1-2x / week for 12-18 visits. Certification period From: 2023  To: 2024     GOALS (Long term same as Short term):  1) Patient will demonstrate good understanding of home program (exercises/activities/diagnosis/prognosis/goals) with good accuracy. 2) Patient will demonstrate increased active/passive range of motion of their LUE by atleast 10* for ADL/IADL completion. 3) Patient will demonstrate increased /pinch strength of at least 10  pounds of their L hand and 4+/5 throughout LUE. 4) Patient to report decreased pain in their affected L upper extremity from 5/10 to 2/10 or less with resistive functional use. 5) Patient will report ADL functions as Mod I/I using LUE. 6) Patient will demonstrate improved functional activity tolerance from poor to fair+ for ADL/IADL completion.   7) Patient will decrease QuickDASH score to 30% or less for increased participation in daily

## 2023-12-14 NOTE — PROGRESS NOTES
OCCUPATIONAL THERAPY DAILY NOTE  [address]  [facility] OCCUPATIONAL THERAPY  [address] [place] [address]  Dept: [phone]  Loc: Marco Antonio Celestin OT Fax: [phone]      Date:  2023    Initial Evaluation Date: 2023                          Evaluating Therapist: Jimmy Lanza OT     Patient Name:  Salma Bobby                        :  1945     Restrictions/Precautions:  none, low fall risk  Diagnosis:  M25.512 (ICD-10-CM) - Left shoulder pain, unspecified chronicity   Possible impingement, and subscapularis injury                   Date of Surgery/Injury: ongoing     Insurance/Certification information:  A[employer]na Medicare  Plan of care signed (Y/N): N  Visit# / total visits: -     Referring Practitioner: Torri Oconnor DO  Specific Practitioner Orders: Eval and treat    OT PLAN OF CARE   OT POC based on physician orders, patient diagnosis and results of clinical assessment     Frequency/Duration:1-2x / week for 12-18 visits. Certification period From: 2023  To: 2024     GOALS (Long term same as Short term):  1) Patient will demonstrate good understanding of home program (exercises/activities/diagnosis/prognosis/goals) with good accuracy. 2) Patient will demonstrate increased active/passive range of motion of their LUE by atleast 10* for ADL/IADL completion. 3) Patient will demonstrate increased /pinch strength of at least 10  pounds of their L hand and 4+/5 throughout LUE. 4) Patient to report decreased pain in their affected L upper extremity from 5/10 to 2/10 or less with resistive functional use. 5) Patient will report ADL functions as Mod I/I using LUE. 6) Patient will demonstrate improved functional activity tolerance from poor to fair+ for ADL/IADL completion.   7) Patient will decrease QuickDASH score to 30% or less for increased participation in daily

## 2023-12-21 PROBLEM — R09.81 NASAL CONGESTION: Status: ACTIVE | Noted: 2023-12-21

## 2023-12-28 ENCOUNTER — TREATMENT (OUTPATIENT)
Dept: OCCUPATIONAL THERAPY | Age: 78
End: 2023-12-28

## 2023-12-28 DIAGNOSIS — M25.512 LEFT SHOULDER PAIN, UNSPECIFIED CHRONICITY: Primary | ICD-10-CM

## 2023-12-28 NOTE — PROGRESS NOTES
functional activities. TODAY'S TREATMENT     Pain Level: no pain at rest and slight pain with functional use of the arm at the middle deltoid. Subjective: Pt reports increased functional use of the shoulder without pain. Objective:    Updated POC to be completed by 10 th visit. INTERVENTION: COMPLETED: SPECIFICS/COMMENTS:   Modality:     MHP  x 10 mins to LUE to decrease pain/stiffness and increase soft tissue elasticity. IFC x Intensity: 14  Duration: 10 mins   Site: around middle deltoid half way down humeral shaft. W/ MHP to L shoulder to decrease pain and target painful site reported with palpation   AROM:               AAROM:     L shoulder  x Pendulum: lateral, forward/back, circular- clockwise and counter x2 20 sec each. + HEP  - Supine dowel jameel exercises 2x15 each flexion to 120*  - Shoulder towel slides on table top  - UE pulleys x10 mins shoulder flexion         PROM/Stretching:     L shoulder x Pec minor stretches x15 each with 3 sec holds both supine and against a door frame. - ER & IR stretch w/ 5 sec hold x10        Scar Mass/Edema Control:               Strengthening:               Other:     HEP x Pt again educated to avoid over head activities or reaching over head as well as decreasing pain/avoiding pain throughout exercises. Kinesio Tape application x Star taping and muscle inhibitor taping to decrease muscle tension and pain reported at specific site upon palpation. Assessment/Comments: Pt tolerated session well with decreased pain reported and increased functional use. Pt completed different activities with slight increase in pain which he did report however this was very mild. Pt reported significant improvement with pain after kinesio Tape application and pt was given a day to relax the skin before taping again. Pt demo's increase IR however with pain reported when going to the mid back. Continue to progress towards goals.      -Rehab Potential: Good  -Patient

## 2023-12-29 ENCOUNTER — TREATMENT (OUTPATIENT)
Dept: OCCUPATIONAL THERAPY | Age: 78
End: 2023-12-29

## 2023-12-29 DIAGNOSIS — M25.512 LEFT SHOULDER PAIN, UNSPECIFIED CHRONICITY: Primary | ICD-10-CM

## 2023-12-29 NOTE — PROGRESS NOTES
OCCUPATIONAL THERAPY DAILY NOTE  [address]  [facility] OCCUPATIONAL THERAPY  [address] [place] [address]  Dept: [phone]  Loc: Marco Antonio Celestin OT Fax: [phone]      Date:  2023    Initial Evaluation Date: 2023                          Evaluating Therapist: Glendy Eaton OT     Patient Name:  Feliciano Moy                        :  1945     Restrictions/Precautions:  none, low fall risk  Diagnosis:  M25.512 (ICD-10-CM) - Left shoulder pain, unspecified chronicity   Possible impingement, and subscapularis injury                   Date of Surgery/Injury: ongoing     Insurance/Certification information:  A[employer]na Medicare  Plan of care signed (Y/N): N  Visit# / total visits: -     Referring Practitioner: Taty Parham DO  Specific Practitioner Orders: Eval and treat    OT PLAN OF CARE   OT POC based on physician orders, patient diagnosis and results of clinical assessment     Frequency/Duration:1-2x / week for 12-18 visits. Certification period From: 2023  To: 2024     GOALS (Long term same as Short term):  1) Patient will demonstrate good understanding of home program (exercises/activities/diagnosis/prognosis/goals) with good accuracy. 2) Patient will demonstrate increased active/passive range of motion of their LUE by atleast 10* for ADL/IADL completion. 3) Patient will demonstrate increased /pinch strength of at least 10  pounds of their L hand and 4+/5 throughout LUE. 4) Patient to report decreased pain in their affected L upper extremity from 5/10 to 2/10 or less with resistive functional use. 5) Patient will report ADL functions as Mod I/I using LUE. 6) Patient will demonstrate improved functional activity tolerance from poor to fair+ for ADL/IADL completion.   7) Patient will decrease QuickDASH score to 30% or less for increased participation in daily

## 2024-01-03 ENCOUNTER — TREATMENT (OUTPATIENT)
Dept: PHYSICAL THERAPY | Age: 79
End: 2024-01-03
Payer: MEDICARE

## 2024-01-03 ENCOUNTER — TREATMENT (OUTPATIENT)
Dept: OCCUPATIONAL THERAPY | Age: 79
End: 2024-01-03
Payer: MEDICARE

## 2024-01-03 DIAGNOSIS — M54.9 DORSAL BACK PAIN: Primary | ICD-10-CM

## 2024-01-03 DIAGNOSIS — M25.512 LEFT SHOULDER PAIN, UNSPECIFIED CHRONICITY: Primary | ICD-10-CM

## 2024-01-03 PROCEDURE — 97110 THERAPEUTIC EXERCISES: CPT | Performed by: PHYSICAL THERAPIST

## 2024-01-03 PROCEDURE — 97110 THERAPEUTIC EXERCISES: CPT | Performed by: OCCUPATIONAL THERAPIST

## 2024-01-03 PROCEDURE — 97032 APPL MODALITY 1+ESTIM EA 15: CPT | Performed by: OCCUPATIONAL THERAPIST

## 2024-01-03 PROCEDURE — 97140 MANUAL THERAPY 1/> REGIONS: CPT | Performed by: PHYSICAL THERAPIST

## 2024-01-03 NOTE — PROGRESS NOTES
fair    Patient. Education:  [x] Plans/Goals, Risks/Benefits discussed  [x] Home exercise program  Method of Education: [x] Verbal  [x] Demo  [] Written  Comprehension of Education:  [x] Verbalizes understanding.  [x] Demonstrates understanding.  [] Needs Review.  [] Demonstrates/verbalizes understanding of HEP/Ed previously given.    Time In: 8:00 am            Time Out: 9:00 am             CODE  Minutes  Units   23733 Fluidotherapy     88348 Paraffin     58337 Ultrasound     82894 Electrical Stim - Attended 10 1   39262 Iontophoresis     10095 Therapeutic Ex 40 2   99830 Therapeutic Activity     79257 Neuromuscular Re-Ed     80072 Manual Therapy     39753 ADL/COMP Tech Train     48192 Orthotic Management/Training      Other                 Total  50 3     Plan: OT 1-2x/week for 12-18 sessions    [x]  Continues Plan of care with focus on LUE by increasing AROM, strength, endurance, increasing  strength and decreasing pain when using the LUE for ADL/IADL tasks: Treatment covered based on POC and graduated to patient's progress. Pt education continues at each visit to obtain maximum benefits from skilled OT intervention.  []  Alter Plan of care:   []  Discharge:      Saundra Santizo OT R/L, MS #[id number]

## 2024-01-03 NOTE — PROGRESS NOTES
[facility]          Phone: [phone] Fax: [phone]    Physical Therapy Daily Treatment Note  Date:  2023    Patient Name:  Kevin Mckeon    :  1945  MRN: [id number]    Evaluating therapist: Michelle Lane, PT, DPT  [id number]    Restrictions/Precautions:      Diagnosis:     Diagnosis Orders   1. Dorsal back pain            Treatment Diagnosis:    Insurance/Certification information:  [employer]na Medicare - Advantage PPO  Referring Physician:  Lokesh Fernandez DO  Plan of care signed (Y/N):  yes  Visit# / total visits:  3/8  Pain level: 1/10   Time In:  1040  Time Out:  1120    Subjective:  Pt reports lumbar pain with forward flexion while lifting/carrying boxes at home. Pt notes thoracic pain and muscle restriction have nearly resolved.    Exercises:  Exercise/Equipment Resistance/Repetitions Other comments            Piriformis stretch 45 sec x2 ea LE      Hamstring stretch 1 min ea LE      Supine trunk rotation 5x e aside, 10 sec hold      TRA bracing  10x 5 sec hold      Bridges 10x 2 sec hold           Chin tucks      Scapular retractions      Seated throacic extension stretch                                                                               Other Therapeutic Activities: Pt completed TE's this date with mild soreness d/t stretching, modifications made to accommodate L shoulder limitations. He tolerated IDN without complications, noted somewhat decreased tension R side during rotation movements later in session.    Home Exercise Program:  Chin tucks, scapular retractions, piriformis stretch, hamstring stretch, TRA bracing, trunk rotation stretch    Manual Treatments: Positioned prone for needling of B thoracic paraspinals from T5-7 with 1\" needles, twisting manipulation; lumbar L3-5 and single trigger point at restriction with 2\" needles in paraspinals and trigger point at iliac crest with twisting manipulation-- all remaining in place x8 min. All

## 2024-01-05 ENCOUNTER — TREATMENT (OUTPATIENT)
Dept: OCCUPATIONAL THERAPY | Age: 79
End: 2024-01-05

## 2024-01-05 ENCOUNTER — TREATMENT (OUTPATIENT)
Dept: PHYSICAL THERAPY | Age: 79
End: 2024-01-05

## 2024-01-05 DIAGNOSIS — M25.512 LEFT SHOULDER PAIN, UNSPECIFIED CHRONICITY: Primary | ICD-10-CM

## 2024-01-05 DIAGNOSIS — M54.9 DORSAL BACK PAIN: Primary | ICD-10-CM

## 2024-01-05 NOTE — PROGRESS NOTES
[facility]          Phone: [phone] Fax: [phone]    Physical Therapy Daily Treatment Note  Date:  2024    Patient Name:  Kevin Mckeon    :  1945  MRN: [id number]    Evaluating therapist: Michelle Lane, PT, DPT  [id number]    Restrictions/Precautions:      Diagnosis:     Diagnosis Orders   1. Dorsal back pain              Treatment Diagnosis:    Insurance/Certification information:  [employer]na Medicare - Advantage PPO  Referring Physician:  Lokesh Fernandez DO  Plan of care signed (Y/N):  yes  Visit# / total visits:    Pain level: 1/10   Time In:  0759  Time Out:  820    Subjective:  Pt reports pain has remained well managed in thoracic and lumbar regions since interventions. Pt has been limiting how much weight he lifts at a time and has been compliant with HEP. Pt feels he is ready for discharge at this time. He has no pain until lifting >40# particularly in lumbar spine.     Exercises:  Exercise/Equipment Resistance/Repetitions Other comments            Piriformis stretch      Hamstring stretch      Supine trunk rotation      TRA bracing       Bridges           Chin tucks      Scapular retractions      Seated throacic extension stretch                                                          Modified Oswestry 14% disability                     Other Therapeutic Activities: Pt demonstrates restored ROM with slow simulation of golf swing mechanics. He reports planning to \"baby it\" until going to [place] next month to allow rest of aggressive activities but remain active with household chores and projects. Pt reports maintaining pain <3/10 with all activity and lifting <40#. He verbalizes understanding of HEP and continued stretching to maintain improvements. Reviewed all questions and answered to best of this therapist's ability/knowledge.    Home Exercise Program:  Chin tucks, scapular retractions, piriformis stretch, hamstring stretch, TRA bracing, trunk

## 2024-01-05 NOTE — PROGRESS NOTES
[facility]                Phone: [phone] Fax: [phone]    Physical Therapy  Outpatient Discharge Summary     Date:  2024    Patient Name:  Kevin Mckeon    :  1945  MRN: [id number]    DIAGNOSIS:     Diagnosis Orders   1. Dorsal back pain          REFERRING PHYSICIAN:  Lokesh Fernandez DO    ATTENDANCE:  Pt has attended 4 of 4 scheduled treatments from 2023 to 2024.  TREATMENTS RECEIVED:  Skilled PT services included therapeutic exercises for improved ROM and stability of thoracic and lumbar spine; manual therapy including MFR, spinal joint mobilizations, and dry needling.    INITIAL STATUS:  Pain reported 2-8/10  ROM decreased in B rotation through lumbar and thoracic spine regions  Strength decreased in core stabilizers  Decreased functional ability with IADLs , sitting tolerance, standing tolerance , reaching, lifting, carrying, sports, inability to participate in hobbies, sleep quality  Modified Oswestry 30% disability    FINAL STATUS:  Pain reported 0-3/10  ROM restored to normalized motion in B rotation through lumbar and thoracic spine regions  Strength improved in core stabilizers  Pt reports improved activity tolerance prior to onset of discomfort  Modified Oswestry 14% disability    GOALS:  6 out of 6 Long Term Goals were obtained.    LONG TERM GOALS NOT OBTAINED/REASON:  N/A all LTG met    PATIENT GOALS:  pain relief, return to prior activity, get back to normal, return to exercise regimen / fitness program, learn how to manage condition  -- met    REASON FOR DISCHARGE:  all LTG met, pt independent with HEP    PATIENT EDUCATION/INSTRUCTIONS:  continue with stretches and HEP to maintain improvements in ROM and tissue extensibility    RECOMMENDATIONS:  follow up with referring provider PRN      Thank you for the opportunity to work with your patient. If you have questions or comments, please feel free to contact me by phone or

## 2024-01-05 NOTE — PROGRESS NOTES
OCCUPATIONAL THERAPY DAILY NOTE  [facility] PHYSICIANS [place] SPECIALTY CARE [facility] OCCUPATIONAL THERAPY  [address]  [address] FLO[address]  [facility] [address]  Dept: [phone]  Loc: [phone]   [facility] OT Fax: [phone]      Date:  2024    Initial Evaluation Date: 2023                          Evaluating Therapist: Saundra Santizo OT     Patient Name:  Kevin Mckeon                        :  1945     Restrictions/Precautions:  none, low fall risk  Diagnosis:  M25.512 (ICD-10-CM) - Left shoulder pain, unspecified chronicity   Possible impingement, and subscapularis injury                   Date of Surgery/Injury: ongoing     Insurance/Certification information:  Aetna Medicare  Plan of care signed (Y/N): N  Visit# / total visits: -     Referring Practitioner: Lokesh Fernandez DO  Specific Practitioner Orders: Eval and treat    OT PLAN OF CARE   OT POC based on physician orders, patient diagnosis and results of clinical assessment     Frequency/Duration:1-2x / week for 12-18 visits.   Certification period From: 2023  To: 2024     GOALS (Long term same as Short term):  1) Patient will demonstrate good understanding of home program (exercises/activities/diagnosis/prognosis/goals) with good accuracy.   2) Patient will demonstrate increased active/passive range of motion of their LUE by atleast 10* for ADL/IADL completion.  3) Patient will demonstrate increased /pinch strength of at least 10  pounds of their L hand and 4+/5 throughout LUE.   4) Patient to report decreased pain in their affected L upper extremity from 5/10 to 2/10 or less with resistive functional use.   5) Patient will report ADL functions as Mod I/I using LUE.   6) Patient will demonstrate improved functional activity tolerance from poor to fair+ for ADL/IADL completion.  7) Patient will decrease QuickDASH score to 30% or less for increased participation in daily

## 2024-01-10 ENCOUNTER — TREATMENT (OUTPATIENT)
Dept: OCCUPATIONAL THERAPY | Age: 79
End: 2024-01-10
Payer: MEDICARE

## 2024-01-10 DIAGNOSIS — M25.512 LEFT SHOULDER PAIN, UNSPECIFIED CHRONICITY: Primary | ICD-10-CM

## 2024-01-10 PROCEDURE — 97032 APPL MODALITY 1+ESTIM EA 15: CPT | Performed by: OCCUPATIONAL THERAPIST

## 2024-01-10 PROCEDURE — 97110 THERAPEUTIC EXERCISES: CPT | Performed by: OCCUPATIONAL THERAPIST

## 2024-01-10 PROCEDURE — 97140 MANUAL THERAPY 1/> REGIONS: CPT | Performed by: OCCUPATIONAL THERAPIST

## 2024-01-10 NOTE — PROGRESS NOTES
OCCUPATIONAL THERAPY DAILY NOTE  [facility] PHYSICIANS [place] SPECIALTY CARE [facility] OCCUPATIONAL THERAPY  [address]  [address] FLO[address]  [facility] [address]  Dept: [phone]  Loc: [phone]   [facility] OT Fax: [phone]      Date:  1/10/2024    Initial Evaluation Date: 2023                          Evaluating Therapist: Saundra Santizo OT     Patient Name:  Kevin Mckeon                        :  1945     Restrictions/Precautions:  none, low fall risk  Diagnosis:  M25.512 (ICD-10-CM) - Left shoulder pain, unspecified chronicity   Possible impingement, and subscapularis injury                   Date of Surgery/Injury: ongoing     Insurance/Certification information:  Aetna Medicare  Plan of care signed (Y/N): N  Visit# / total visits: -     Referring Practitioner: Lokesh Fernandez DO  Specific Practitioner Orders: Eval and treat    OT PLAN OF CARE   OT POC based on physician orders, patient diagnosis and results of clinical assessment     Frequency/Duration:1-2x / week for 12-18 visits.   Certification period From: 2023  To: 2024     GOALS (Long term same as Short term):  1) Patient will demonstrate good understanding of home program (exercises/activities/diagnosis/prognosis/goals) with good accuracy.   2) Patient will demonstrate increased active/passive range of motion of their LUE by atleast 10* for ADL/IADL completion.  3) Patient will demonstrate increased /pinch strength of at least 10  pounds of their L hand and 4+/5 throughout LUE.   4) Patient to report decreased pain in their affected L upper extremity from 5/10 to 2/10 or less with resistive functional use.   5) Patient will report ADL functions as Mod I/I using LUE.   6) Patient will demonstrate improved functional activity tolerance from poor to fair+ for ADL/IADL completion.  7) Patient will decrease QuickDASH score to 30% or less for increased participation in daily

## 2024-01-12 ENCOUNTER — TREATMENT (OUTPATIENT)
Dept: OCCUPATIONAL THERAPY | Age: 79
End: 2024-01-12

## 2024-01-12 DIAGNOSIS — M25.512 LEFT SHOULDER PAIN, UNSPECIFIED CHRONICITY: Primary | ICD-10-CM

## 2024-01-12 NOTE — PROGRESS NOTES
OCCUPATIONAL THERAPY DAILY NOTE  [facility] PHYSICIANS [place] SPECIALTY CARE [facility] OCCUPATIONAL THERAPY  [address]  [address] FLO[address]  [facility] [address]  Dept: [phone]  Loc: [phone]   [facility] OT Fax: [phone]      Date:  2024    Initial Evaluation Date: 2023                          Evaluating Therapist: Saundra Santizo OT     Patient Name:  Kevin Mckeon                        :  1945     Restrictions/Precautions:  none, low fall risk  Diagnosis:  M25.512 (ICD-10-CM) - Left shoulder pain, unspecified chronicity   Possible impingement, and subscapularis injury                   Date of Surgery/Injury: ongoing     Insurance/Certification information:  Aetna Medicare  Plan of care signed (Y/N): N  Visit# / total visits: 10 / 12-     Referring Practitioner: Lokesh Fernandez DO  Specific Practitioner Orders: Eval and treat    OT PLAN OF CARE   OT POC based on physician orders, patient diagnosis and results of clinical assessment     Frequency/Duration:1-2x / week for 12-18 visits.   Certification period From: 2023  To: 2024     GOALS (Long term same as Short term):  1) Patient will demonstrate good understanding of home program (exercises/activities/diagnosis/prognosis/goals) with good accuracy.   2) Patient will demonstrate increased active/passive range of motion of their LUE by atleast 10* for ADL/IADL completion.  3) Patient will demonstrate increased /pinch strength of at least 10  pounds of their L hand and 4+/5 throughout LUE.   4) Patient to report decreased pain in their affected L upper extremity from 5/10 to 2/10 or less with resistive functional use.   5) Patient will report ADL functions as Mod I/I using LUE.   6) Patient will demonstrate improved functional activity tolerance from poor to fair+ for ADL/IADL completion.  7) Patient will decrease QuickDASH score to 30% or less for increased participation in daily

## 2024-01-17 ENCOUNTER — TREATMENT (OUTPATIENT)
Dept: OCCUPATIONAL THERAPY | Age: 79
End: 2024-01-17

## 2024-01-17 DIAGNOSIS — M25.512 LEFT SHOULDER PAIN, UNSPECIFIED CHRONICITY: Primary | ICD-10-CM

## 2024-01-17 NOTE — PROGRESS NOTES
OCCUPATIONAL THERAPY DAILY NOTE  [facility] PHYSICIANS [place] SPECIALTY CARE [facility] OCCUPATIONAL THERAPY  [address]  [address] FLO[address]  [facility] [address]  Dept: [phone]  Loc: [phone]   [facility] OT Fax: [phone]      Date:  2024    Initial Evaluation Date: 2023                          Evaluating Therapist: Saundra Santizo OT     Patient Name:  Kevin Mckeon                        :  1945     Restrictions/Precautions:  none, low fall risk  Diagnosis:  M25.512 (ICD-10-CM) - Left shoulder pain, unspecified chronicity   Possible impingement, and subscapularis injury                   Date of Surgery/Injury: ongoing     Insurance/Certification information:  Aetna Medicare  Plan of care signed (Y/N): N  Visit# / total visits: -     Referring Practitioner: Lokesh Fernandez DO  Specific Practitioner Orders: Eval and treat    OT PLAN OF CARE   OT POC based on physician orders, patient diagnosis and results of clinical assessment     Frequency/Duration:1-2x / week for 12-18 visits.   Certification period From: 2023  To: 2024     GOALS (Long term same as Short term):  1) Patient will demonstrate good understanding of home program (exercises/activities/diagnosis/prognosis/goals) with good accuracy.   2) Patient will demonstrate increased active/passive range of motion of their LUE by atleast 10* for ADL/IADL completion.  3) Patient will demonstrate increased /pinch strength of at least 10  pounds of their L hand and 4+/5 throughout LUE.   4) Patient to report decreased pain in their affected L upper extremity from 5/10 to 2/10 or less with resistive functional use.   5) Patient will report ADL functions as Mod I/I using LUE.   6) Patient will demonstrate improved functional activity tolerance from poor to fair+ for ADL/IADL completion.  7) Patient will decrease QuickDASH score to 30% or less for increased participation in daily

## 2024-01-19 ENCOUNTER — TREATMENT (OUTPATIENT)
Dept: OCCUPATIONAL THERAPY | Age: 79
End: 2024-01-19

## 2024-01-19 DIAGNOSIS — M25.512 LEFT SHOULDER PAIN, UNSPECIFIED CHRONICITY: Primary | ICD-10-CM

## 2024-01-19 NOTE — PROGRESS NOTES
OCCUPATIONAL THERAPY DAILY NOTE  [facility] PHYSICIANS [place] SPECIALTY CARE [facility] OCCUPATIONAL THERAPY  [address]  [address] FLO[address]  [facility] [address]  Dept: [phone]  Loc: [phone]   [facility] OT Fax: [phone]      Date:  2024    Initial Evaluation Date: 2023                          Evaluating Therapist: Saundra Santizo OT     Patient Name:  Kevin Mckeon                        :  1945     Restrictions/Precautions:  none, low fall risk  Diagnosis:  M25.512 (ICD-10-CM) - Left shoulder pain, unspecified chronicity   Possible impingement, and subscapularis injury                   Date of Surgery/Injury: ongoing     Insurance/Certification information:  Aetna Medicare  Plan of care signed (Y/N): N  Visit# / total visits: -     Referring Practitioner: Lokesh Fernandez DO  Specific Practitioner Orders: Eval and treat    OT PLAN OF CARE   OT POC based on physician orders, patient diagnosis and results of clinical assessment     Frequency/Duration:1-2x / week for 12-18 visits.   Certification period From: 2023  To: 2024     GOALS (Long term same as Short term):  1) Patient will demonstrate good understanding of home program (exercises/activities/diagnosis/prognosis/goals) with good accuracy.   2) Patient will demonstrate increased active/passive range of motion of their LUE by atleast 10* for ADL/IADL completion.  3) Patient will demonstrate increased /pinch strength of at least 10  pounds of their L hand and 4+/5 throughout LUE.   4) Patient to report decreased pain in their affected L upper extremity from 5/10 to 2/10 or less with resistive functional use.   5) Patient will report ADL functions as Mod I/I using LUE.   6) Patient will demonstrate improved functional activity tolerance from poor to fair+ for ADL/IADL completion.  7) Patient will decrease QuickDASH score to 30% or less for increased participation in daily

## 2024-01-24 ENCOUNTER — TREATMENT (OUTPATIENT)
Dept: OCCUPATIONAL THERAPY | Age: 79
End: 2024-01-24

## 2024-01-24 DIAGNOSIS — M25.512 LEFT SHOULDER PAIN, UNSPECIFIED CHRONICITY: Primary | ICD-10-CM

## 2024-01-24 NOTE — PROGRESS NOTES
OCCUPATIONAL THERAPY DAILY NOTE  [facility] PHYSICIANS [place] SPECIALTY CARE [facility] OCCUPATIONAL THERAPY  [address]  2ND FLO[address]  [facility] [address]  Dept: [phone]  Loc: [phone]   [facility] OT Fax: [phone]      Date:  2024    Initial Evaluation Date: 2023                          Evaluating Therapist: Saundra Santizo OT     Patient Name:  Kevin Mckeon                        :  1945     Restrictions/Precautions:  none, low fall risk  Diagnosis:  M25.512 (ICD-10-CM) - Left shoulder pain, unspecified chronicity   Possible impingement, and subscapularis injury                   Date of Surgery/Injury: ongoing     Insurance/Certification information:  [employer] Medicare  Plan of care signed (Y/N): N  Visit# / total visits: -     Referring Practitioner: Lokesh Fernandez DO  Specific Practitioner Orders: Eval and treat    OT PLAN OF CARE   OT POC based on physician orders, patient diagnosis and results of clinical assessment     Frequency/Duration:1-2x / week for 12-18 visits.   Certification period From: 2023  To: 2024     GOALS (Long term same as Short term):  1) Patient will demonstrate good understanding of home program (exercises/activities/diagnosis/prognosis/goals) with good accuracy.   Progressing, Pt demo's good carry over with all HEP and continued HEP to be issued throughout session.   2) Patient will demonstrate increased active/passive range of motion of their LUE by atleast 10* for ADL/IADL completion.  Goal Met, Pt has improved AROM for atleast 10* of motion in all shoulder planes.   3) Patient will demonstrate increased /pinch strength of at least 10  pounds of their L hand and 4+/5 throughout LUE.   Progressing, Pt still demonstrates limitations with  strength and LUE strength. See below measurements.   4) Patient to report decreased pain in their affected L upper extremity from 5/10 to 2/10 or less

## 2024-01-26 ENCOUNTER — TREATMENT (OUTPATIENT)
Dept: OCCUPATIONAL THERAPY | Age: 79
End: 2024-01-26

## 2024-01-26 DIAGNOSIS — M25.512 LEFT SHOULDER PAIN, UNSPECIFIED CHRONICITY: Primary | ICD-10-CM

## 2024-01-26 NOTE — PROGRESS NOTES
OCCUPATIONAL THERAPY DAILY NOTE  [facility] PHYSICIANS [place] SPECIALTY CARE [facility] OCCUPATIONAL THERAPY  [address]  2ND FLOOR  [facility] [address]  Dept: [phone]  Loc: [phone]   [facility] OT Fax: [phone]      Date:  2024    Initial Evaluation Date: 2023                          Evaluating Therapist: Saundra Santizo OT     Patient Name:  Kevin Mckeon                        :  1945     Restrictions/Precautions:  none, low fall risk  Diagnosis:  M25.512 (ICD-10-CM) - Left shoulder pain, unspecified chronicity   Possible impingement, and subscapularis injury                   Date of Surgery/Injury: ongoing     Insurance/Certification information:  [employer]na Medicare  Plan of care signed (Y/N): N  Visit# / total visits: -     Referring Practitioner: Lokesh Fernandez DO  Specific Practitioner Orders: Eval and treat    OT PLAN OF CARE   OT POC based on physician orders, patient diagnosis and results of clinical assessment     Frequency/Duration:1-2x / week for 12-18 visits.   Certification period From: 2023  To: 2024     GOALS (Long term same as Short term):  1) Patient will demonstrate good understanding of home program (exercises/activities/diagnosis/prognosis/goals) with good accuracy.   Progressing, Pt demo's good carry over with all HEP and continued HEP to be issued throughout session.   2) Patient will demonstrate increased active/passive range of motion of their LUE by atleast 10* for ADL/IADL completion.  Goal Met, Pt has improved AROM for atleast 10* of motion in all shoulder planes.   3) Patient will demonstrate increased /pinch strength of at least 10  pounds of their L hand and 4+/5 throughout LUE.   Progressing, Pt still demonstrates limitations with  strength and LUE strength. See below measurements.   4) Patient to report decreased pain in their affected L upper extremity from 5/10 to 2/10 or less

## 2024-01-31 ENCOUNTER — TREATMENT (OUTPATIENT)
Dept: OCCUPATIONAL THERAPY | Age: 79
End: 2024-01-31
Payer: MEDICARE

## 2024-01-31 DIAGNOSIS — M25.512 LEFT SHOULDER PAIN, UNSPECIFIED CHRONICITY: Primary | ICD-10-CM

## 2024-01-31 PROCEDURE — 97530 THERAPEUTIC ACTIVITIES: CPT | Performed by: OCCUPATIONAL THERAPIST

## 2024-01-31 PROCEDURE — 97110 THERAPEUTIC EXERCISES: CPT | Performed by: OCCUPATIONAL THERAPIST

## 2024-01-31 NOTE — PROGRESS NOTES
OCCUPATIONAL THERAPY DAILY NOTE/ON HOLD  [facility] PHYSICIANS [place] SPECIALTY CARE [facility] OCCUPATIONAL THERAPY  [address]  2ND FLOOR  [facility] [address]  Dept: [phone]  Loc: [phone]   [facility] [place] OT Fax: [phone]      Date:  2024    Initial Evaluation Date: 2023                          Evaluating Therapist: Saundra Santizo OT     Patient Name:  Kevin Mckeon                        :  1945     Restrictions/Precautions:  none, low fall risk  Diagnosis:  M25.512 (ICD-10-CM) - Left shoulder pain, unspecified chronicity   Possible impingement, and subscapularis injury                   Date of Surgery/Injury: ongoing     Insurance/Certification information:  [employer]na Medicare  Plan of care signed (Y/N): N  Visit# / total visits: 15 / 12-     Referring Practitioner: Lokesh Fernandez DO  Specific Practitioner Orders: Eval and treat    OT PLAN OF CARE   OT POC based on physician orders, patient diagnosis and results of clinical assessment     Frequency/Duration:1-2x / week for 12-18 visits.   Certification period From: 2023  To: 2024     GOALS (Long term same as Short term):  1) Patient will demonstrate good understanding of home program (exercises/activities/diagnosis/prognosis/goals) with good accuracy.   Progressing, Pt demo's good carry over with all HEP and continued HEP to be issued throughout session.   2) Patient will demonstrate increased active/passive range of motion of their LUE by atleast 10* for ADL/IADL completion.  Goal Met, Pt has improved AROM for atleast 10* of motion in all shoulder planes.   3) Patient will demonstrate increased /pinch strength of at least 10  pounds of their L hand and 4+/5 throughout LUE.   Progressing, Pt still demonstrates limitations with  strength and LUE strength. See below measurements.   4) Patient to report decreased pain in their affected L upper extremity from 5/10 to 2/10

## 2024-03-05 ENCOUNTER — OFFICE VISIT (OUTPATIENT)
Dept: PRIMARY CARE CLINIC | Age: 79
End: 2024-03-05
Payer: MEDICARE

## 2024-03-05 VITALS
DIASTOLIC BLOOD PRESSURE: 70 MMHG | HEIGHT: 68 IN | OXYGEN SATURATION: 96 % | BODY MASS INDEX: 28.49 KG/M2 | TEMPERATURE: 98 F | HEART RATE: 82 BPM | WEIGHT: 188 LBS | SYSTOLIC BLOOD PRESSURE: 118 MMHG

## 2024-03-05 DIAGNOSIS — M79.602 ARM PAIN, ANTERIOR, LEFT: ICD-10-CM

## 2024-03-05 DIAGNOSIS — E78.00 PURE HYPERCHOLESTEROLEMIA: ICD-10-CM

## 2024-03-05 DIAGNOSIS — M25.512 LEFT SHOULDER PAIN, UNSPECIFIED CHRONICITY: Primary | ICD-10-CM

## 2024-03-05 DIAGNOSIS — E11.9 TYPE 2 DIABETES MELLITUS WITHOUT COMPLICATION, WITHOUT LONG-TERM CURRENT USE OF INSULIN (HCC): ICD-10-CM

## 2024-03-05 PROCEDURE — 1123F ACP DISCUSS/DSCN MKR DOCD: CPT | Performed by: FAMILY MEDICINE

## 2024-03-05 PROCEDURE — 99214 OFFICE O/P EST MOD 30 MIN: CPT | Performed by: FAMILY MEDICINE

## 2024-03-05 ASSESSMENT — ENCOUNTER SYMPTOMS
RESPIRATORY NEGATIVE: 1
GASTROINTESTINAL NEGATIVE: 1
ALLERGIC/IMMUNOLOGIC NEGATIVE: 1
EYES NEGATIVE: 1

## 2024-03-05 ASSESSMENT — PATIENT HEALTH QUESTIONNAIRE - PHQ9
1. LITTLE INTEREST OR PLEASURE IN DOING THINGS: 0
SUM OF ALL RESPONSES TO PHQ9 QUESTIONS 1 & 2: 0
SUM OF ALL RESPONSES TO PHQ QUESTIONS 1-9: 0
SUM OF ALL RESPONSES TO PHQ QUESTIONS 1-9: 0
2. FEELING DOWN, DEPRESSED OR HOPELESS: 0
SUM OF ALL RESPONSES TO PHQ QUESTIONS 1-9: 0
SUM OF ALL RESPONSES TO PHQ QUESTIONS 1-9: 0

## 2024-03-05 NOTE — PROGRESS NOTES
3/5/24     Kevin Mckeon    : 1945 Sex: male   Age: 78 y.o.      Chief Complaint   Patient presents with    Shoulder Pain       Prior to Admission medications    Medication Sig Start Date End Date Taking? Authorizing Provider   montelukast (SINGULAIR) 10 MG tablet TAKE 1 TABLET NIGHTLY 3/28/23  Yes Lokesh Fernandez DO   fluticasone (FLONASE) 50 MCG/ACT nasal spray USE 2 SPRAYS NASALLY DAILY 3/20/23  Yes Lokesh Fernandez DO   Clobetasol Propionate 0.05 % SHAM Qd prn 3/20/23  Yes Lokesh Fernandez DO   famotidine (PEPCID) 20 MG tablet TAKE 1 TABLET DAILY 3/8/23  Yes Lokesh Fernandez DO   pravastatin (PRAVACHOL) 40 MG tablet TAKE 1 TABLET DAILY 3/8/23  Yes Lokesh Fernandez DO   vitamin E 400 UNIT capsule Take 1 capsule by mouth daily   Yes Crista Rivero MD   aspirin 81 MG tablet Take 1 tablet by mouth daily   Yes ProviderCrista MD   sildenafil (REVATIO) 20 MG tablet Take 1 tablet by mouth daily as needed (as needed) 5/15/19  Yes Lokesh Fernandez DO   Coenzyme Q10 (CO Q-10) 200 MG CAPS Take 1 capsule by mouth daily   Yes ProviderCrista MD   Multiple Vitamins-Minerals (THERAPEUTIC MULTIVITAMIN-MINERALS) tablet Take 1 tablet by mouth daily   Yes Crista Rivero MD   Ascorbic Acid (VITAMIN C) 250 MG tablet Take 2 tablets by mouth daily   Yes ProviderCrista MD Saw Palmetto, Serenoa repens, 1000 MG CAPS Take 1,000 mg by mouth daily    Yes ProviderCrista MD          HPI: Patient evaluated today with shoulder and arm pain that has been persistent.  He had some improvement with physical therapy and then spent the last month in [facility] and on return having recurring problems with discomfort lateral aspect left humerus.  X-rays will be repeated.  Orthopedic referral today.  Diabetes hyperlipidemia stable lab studies to be completed and reassess with me next month.          Review of Systems   Constitutional: Negative.    HENT: Negative.     Eyes:

## 2024-03-28 DIAGNOSIS — E11.9 TYPE 2 DIABETES MELLITUS WITHOUT COMPLICATION, WITHOUT LONG-TERM CURRENT USE OF INSULIN (HCC): ICD-10-CM

## 2024-03-28 DIAGNOSIS — E78.00 PURE HYPERCHOLESTEROLEMIA: ICD-10-CM

## 2024-03-28 LAB
ALBUMIN SERPL-MCNC: 4.8 G/DL
ALP BLD-CCNC: 52 U/L
ALT SERPL-CCNC: 28 U/L
ANION GAP SERPL CALCULATED.3IONS-SCNC: ABNORMAL MMOL/L
AST SERPL-CCNC: 18 U/L
BILIRUB SERPL-MCNC: 1 MG/DL (ref 0.1–1.4)
BUN BLDV-MCNC: 17 MG/DL
CALCIUM SERPL-MCNC: 9.8 MG/DL
CHLORIDE BLD-SCNC: 101 MMOL/L
CHOLESTEROL, TOTAL: 232 MG/DL
CHOLESTEROL/HDL RATIO: 3.3
CO2: 28 MMOL/L
CREAT SERPL-MCNC: 0.81 MG/DL
EGFR: 91
ESTIMATED AVERAGE GLUCOSE: ABNORMAL
GLUCOSE BLD-MCNC: 113 MG/DL
HBA1C MFR BLD: 5.7 %
HDLC SERPL-MCNC: 70 MG/DL (ref 35–70)
LDL CHOLESTEROL CALCULATED: 131 MG/DL (ref 0–160)
NONHDLC SERPL-MCNC: 162 MG/DL
POTASSIUM SERPL-SCNC: 4.1 MMOL/L
SODIUM BLD-SCNC: 140 MMOL/L
T4 TOTAL: 6.4
TOTAL PROTEIN: 7.2
TRIGL SERPL-MCNC: 177 MG/DL
TSH SERPL DL<=0.05 MIU/L-ACNC: 1.66 UIU/ML
VLDLC SERPL CALC-MCNC: ABNORMAL MG/DL

## 2024-04-08 ENCOUNTER — OFFICE VISIT (OUTPATIENT)
Dept: PRIMARY CARE CLINIC | Age: 79
End: 2024-04-08
Payer: MEDICARE

## 2024-04-08 VITALS
HEIGHT: 68 IN | WEIGHT: 180 LBS | SYSTOLIC BLOOD PRESSURE: 136 MMHG | DIASTOLIC BLOOD PRESSURE: 80 MMHG | TEMPERATURE: 98.3 F | HEART RATE: 67 BPM | BODY MASS INDEX: 27.28 KG/M2 | OXYGEN SATURATION: 98 %

## 2024-04-08 DIAGNOSIS — E78.00 PURE HYPERCHOLESTEROLEMIA: Chronic | ICD-10-CM

## 2024-04-08 DIAGNOSIS — Z12.5 PROSTATE CANCER SCREENING: ICD-10-CM

## 2024-04-08 DIAGNOSIS — K21.9 GASTROESOPHAGEAL REFLUX DISEASE WITHOUT ESOPHAGITIS: Chronic | ICD-10-CM

## 2024-04-08 DIAGNOSIS — M25.512 CHRONIC LEFT SHOULDER PAIN: Primary | ICD-10-CM

## 2024-04-08 DIAGNOSIS — G89.29 CHRONIC LEFT SHOULDER PAIN: Primary | ICD-10-CM

## 2024-04-08 DIAGNOSIS — E11.9 TYPE 2 DIABETES MELLITUS WITHOUT COMPLICATION, WITHOUT LONG-TERM CURRENT USE OF INSULIN (HCC): ICD-10-CM

## 2024-04-08 PROCEDURE — 3044F HG A1C LEVEL LT 7.0%: CPT | Performed by: FAMILY MEDICINE

## 2024-04-08 PROCEDURE — 1123F ACP DISCUSS/DSCN MKR DOCD: CPT | Performed by: FAMILY MEDICINE

## 2024-04-08 PROCEDURE — 99214 OFFICE O/P EST MOD 30 MIN: CPT | Performed by: FAMILY MEDICINE

## 2024-04-08 PROCEDURE — G2211 COMPLEX E/M VISIT ADD ON: HCPCS | Performed by: FAMILY MEDICINE

## 2024-04-08 RX ORDER — PRAVASTATIN SODIUM 40 MG
TABLET ORAL
Qty: 90 TABLET | Refills: 3 | Status: SHIPPED | OUTPATIENT
Start: 2024-04-08

## 2024-04-08 RX ORDER — FAMOTIDINE 20 MG/1
20 TABLET, FILM COATED ORAL DAILY
Qty: 90 TABLET | Refills: 3 | Status: SHIPPED | OUTPATIENT
Start: 2024-04-08

## 2024-04-08 RX ORDER — CLOBETASOL PROPIONATE 0.05 G/100ML
SHAMPOO TOPICAL
Qty: 118 ML | Refills: 1 | Status: SHIPPED | OUTPATIENT
Start: 2024-04-08

## 2024-04-08 RX ORDER — MONTELUKAST SODIUM 10 MG/1
10 TABLET ORAL NIGHTLY
Qty: 90 TABLET | Refills: 3 | Status: SHIPPED | OUTPATIENT
Start: 2024-04-08

## 2024-04-08 RX ORDER — FLUTICASONE PROPIONATE 50 MCG
SPRAY, SUSPENSION (ML) NASAL
Qty: 48 G | Refills: 3 | Status: SHIPPED | OUTPATIENT
Start: 2024-04-08

## 2024-04-08 SDOH — ECONOMIC STABILITY: HOUSING INSECURITY
IN THE LAST 12 MONTHS, WAS THERE A TIME WHEN YOU DID NOT HAVE A STEADY PLACE TO SLEEP OR SLEPT IN A SHELTER (INCLUDING NOW)?: NO

## 2024-04-08 SDOH — ECONOMIC STABILITY: FOOD INSECURITY: WITHIN THE PAST 12 MONTHS, YOU WORRIED THAT YOUR FOOD WOULD RUN OUT BEFORE YOU GOT MONEY TO BUY MORE.: NEVER TRUE

## 2024-04-08 SDOH — ECONOMIC STABILITY: FOOD INSECURITY: WITHIN THE PAST 12 MONTHS, THE FOOD YOU BOUGHT JUST DIDN'T LAST AND YOU DIDN'T HAVE MONEY TO GET MORE.: NEVER TRUE

## 2024-04-08 SDOH — ECONOMIC STABILITY: INCOME INSECURITY: HOW HARD IS IT FOR YOU TO PAY FOR THE VERY BASICS LIKE FOOD, HOUSING, MEDICAL CARE, AND HEATING?: NOT HARD AT ALL

## 2024-04-08 ASSESSMENT — ENCOUNTER SYMPTOMS
GASTROINTESTINAL NEGATIVE: 1
ALLERGIC/IMMUNOLOGIC NEGATIVE: 1
RESPIRATORY NEGATIVE: 1
EYES NEGATIVE: 1

## 2024-04-08 NOTE — PROGRESS NOTES
24     Kevin Mckeon    : 1945 Sex: male   Age: 78 y.o.      Chief Complaint   Patient presents with    Shoulder Pain    Discuss Labs       Prior to Admission medications    Medication Sig Start Date End Date Taking? Authorizing Provider   pravastatin (PRAVACHOL) 40 MG tablet TAKE 1 TABLET DAILY 24  Yes Lokesh Fernandez DO   montelukast (SINGULAIR) 10 MG tablet Take 1 tablet by mouth nightly 24  Yes Lokesh Fernandez DO   fluticasone (FLONASE) 50 MCG/ACT nasal spray USE 2 SPRAYS NASALLY DAILY 24  Yes Lokesh Fernandez DO   famotidine (PEPCID) 20 MG tablet Take 1 tablet by mouth daily 24  Yes Lokesh Fernandez DO   Clobetasol Propionate 0.05 % SHAM Qd prn 24  Yes Lokesh Fernandez DO   vitamin E 400 UNIT capsule Take 1 capsule by mouth daily   Yes Crista Rivero MD   aspirin 81 MG tablet Take 1 tablet by mouth daily   Yes ProviderCrista MD   sildenafil (REVATIO) 20 MG tablet Take 1 tablet by mouth daily as needed (as needed) 5/15/19  Yes Lokesh Fernandez DO   Coenzyme Q10 (CO Q-10) 200 MG CAPS Take 1 capsule by mouth daily   Yes ProviderCrista MD   Multiple Vitamins-Minerals (THERAPEUTIC MULTIVITAMIN-MINERALS) tablet Take 1 tablet by mouth daily   Yes Crista Rivero MD   Ascorbic Acid (VITAMIN C) 250 MG tablet Take 2 tablets by mouth daily   Yes ProviderCrista MD Saw Palmetto Serenoa repens, 1000 MG CAPS Take 1,000 mg by mouth daily    Yes ProviderCrista MD          HPI: Patient evaluated today problems with chronic left shoulder pain and currently seeing Dr. Darius Askew and doing well.  Plans for MRI of the shoulder and evaluation with Dr. Elliott as well.  Reflux disease hyperlipidemia stable meds as prescribed.          Review of Systems   Constitutional: Negative.    HENT: Negative.     Eyes: Negative.    Respiratory: Negative.     Gastrointestinal: Negative.    Endocrine: Negative.    Genitourinary: Negative.

## 2024-09-07 ENCOUNTER — APPOINTMENT (OUTPATIENT)
Dept: NUCLEAR MEDICINE | Age: 79
DRG: 101 | End: 2024-09-07
Payer: MEDICARE

## 2024-09-07 ENCOUNTER — HOSPITAL ENCOUNTER (INPATIENT)
Age: 79
LOS: 3 days | Discharge: HOME | DRG: 101 | End: 2024-09-10
Attending: EMERGENCY MEDICINE | Admitting: INTERNAL MEDICINE
Payer: MEDICARE

## 2024-09-07 ENCOUNTER — APPOINTMENT (OUTPATIENT)
Age: 79
DRG: 101 | End: 2024-09-07
Payer: MEDICARE

## 2024-09-07 ENCOUNTER — APPOINTMENT (OUTPATIENT)
Dept: CT IMAGING | Age: 79
DRG: 101 | End: 2024-09-07
Payer: MEDICARE

## 2024-09-07 ENCOUNTER — APPOINTMENT (OUTPATIENT)
Dept: GENERAL RADIOLOGY | Age: 79
DRG: 101 | End: 2024-09-07
Payer: MEDICARE

## 2024-09-07 DIAGNOSIS — R09.02 HYPOXEMIA: ICD-10-CM

## 2024-09-07 DIAGNOSIS — R56.9 SEIZURE-LIKE ACTIVITY (HCC): ICD-10-CM

## 2024-09-07 DIAGNOSIS — R55 SYNCOPE, UNSPECIFIED SYNCOPE TYPE: ICD-10-CM

## 2024-09-07 DIAGNOSIS — I20.9 ANGINA PECTORIS (HCC): ICD-10-CM

## 2024-09-07 DIAGNOSIS — R55 SYNCOPE AND COLLAPSE: Primary | ICD-10-CM

## 2024-09-07 PROBLEM — E87.20 LACTIC ACIDOSIS: Status: ACTIVE | Noted: 2024-09-07

## 2024-09-07 LAB
ALBUMIN SERPL-MCNC: 4.2 G/DL (ref 3.5–5.2)
ALP SERPL-CCNC: 64 U/L (ref 40–129)
ALT SERPL-CCNC: 23 U/L (ref 0–40)
ANION GAP SERPL CALCULATED.3IONS-SCNC: 18 MMOL/L (ref 7–16)
AST SERPL-CCNC: 25 U/L (ref 0–39)
BASOPHILS # BLD: 0.06 K/UL (ref 0–0.2)
BASOPHILS NFR BLD: 1 % (ref 0–2)
BILIRUB SERPL-MCNC: 0.6 MG/DL (ref 0–1.2)
BUN SERPL-MCNC: 24 MG/DL (ref 6–23)
CALCIUM SERPL-MCNC: 9.4 MG/DL (ref 8.6–10.2)
CHLORIDE SERPL-SCNC: 101 MMOL/L (ref 98–107)
CO2 SERPL-SCNC: 19 MMOL/L (ref 22–29)
CREAT SERPL-MCNC: 0.9 MG/DL (ref 0.7–1.2)
ECHO BSA: 1.98 M2
EKG ATRIAL RATE: 82 BPM
EKG P AXIS: 38 DEGREES
EKG P-R INTERVAL: 208 MS
EKG Q-T INTERVAL: 362 MS
EKG QRS DURATION: 100 MS
EKG QTC CALCULATION (BAZETT): 422 MS
EKG R AXIS: 20 DEGREES
EKG T AXIS: 5 DEGREES
EKG VENTRICULAR RATE: 82 BPM
EOSINOPHIL # BLD: 0.26 K/UL (ref 0.05–0.5)
EOSINOPHILS RELATIVE PERCENT: 3 % (ref 0–6)
ERYTHROCYTE [DISTWIDTH] IN BLOOD BY AUTOMATED COUNT: 11.8 % (ref 11.5–15)
GFR, ESTIMATED: 84 ML/MIN/1.73M2
GLUCOSE BLD-MCNC: 103 MG/DL (ref 74–99)
GLUCOSE BLD-MCNC: 107 MG/DL (ref 74–99)
GLUCOSE BLD-MCNC: 116 MG/DL (ref 74–99)
GLUCOSE BLD-MCNC: 244 MG/DL (ref 74–99)
GLUCOSE SERPL-MCNC: 245 MG/DL (ref 74–99)
HBA1C MFR BLD: 5.8 % (ref 4–5.6)
HCT VFR BLD AUTO: 40.8 % (ref 37–54)
HGB BLD-MCNC: 14.6 G/DL (ref 12.5–16.5)
IMM GRANULOCYTES # BLD AUTO: 0.07 K/UL (ref 0–0.58)
IMM GRANULOCYTES NFR BLD: 1 % (ref 0–5)
LACTATE BLDV-SCNC: 2.4 MMOL/L (ref 0.5–2.2)
LACTATE BLDV-SCNC: 6.4 MMOL/L (ref 0.5–2.2)
LYMPHOCYTES NFR BLD: 4.98 K/UL (ref 1.5–4)
LYMPHOCYTES RELATIVE PERCENT: 56 % (ref 20–42)
MAGNESIUM SERPL-MCNC: 2 MG/DL (ref 1.6–2.6)
MCH RBC QN AUTO: 33.8 PG (ref 26–35)
MCHC RBC AUTO-ENTMCNC: 35.8 G/DL (ref 32–34.5)
MCV RBC AUTO: 94.4 FL (ref 80–99.9)
MONOCYTES NFR BLD: 0.95 K/UL (ref 0.1–0.95)
MONOCYTES NFR BLD: 11 % (ref 2–12)
NEUTROPHILS NFR BLD: 29 % (ref 43–80)
NEUTS SEG NFR BLD: 2.58 K/UL (ref 1.8–7.3)
NUC STRESS EJECTION FRACTION: 84 %
PLATELET # BLD AUTO: 150 K/UL (ref 130–450)
PMV BLD AUTO: 11.3 FL (ref 7–12)
POTASSIUM SERPL-SCNC: 3.8 MMOL/L (ref 3.5–5)
PROT SERPL-MCNC: 7 G/DL (ref 6.4–8.3)
RBC # BLD AUTO: 4.32 M/UL (ref 3.8–5.8)
SARS-COV-2 RDRP RESP QL NAA+PROBE: NOT DETECTED
SODIUM SERPL-SCNC: 138 MMOL/L (ref 132–146)
SPECIMEN DESCRIPTION: NORMAL
STRESS BASELINE DIAS BP: 70 MMHG
STRESS BASELINE HR: 84 BPM
STRESS BASELINE SYS BP: 122 MMHG
STRESS ESTIMATED WORKLOAD: 1 METS
STRESS PEAK DIAS BP: 70 MMHG
STRESS PEAK SYS BP: 122 MMHG
STRESS PERCENT HR ACHIEVED: 71 %
STRESS POST PEAK HR: 102 BPM
STRESS RATE PRESSURE PRODUCT: NORMAL BPM*MMHG
STRESS ST DEPRESSION: 0 MM
STRESS TARGET HR: 144 BPM
TID: 1.19
TROPONIN I SERPL HS-MCNC: 15 NG/L (ref 0–11)
TROPONIN I SERPL HS-MCNC: 16 NG/L (ref 0–11)
TROPONIN I SERPL HS-MCNC: 7 NG/L (ref 0–11)
WBC OTHER # BLD: 8.9 K/UL (ref 4.5–11.5)

## 2024-09-07 PROCEDURE — 6360000002 HC RX W HCPCS: Performed by: INTERNAL MEDICINE

## 2024-09-07 PROCEDURE — 2580000003 HC RX 258

## 2024-09-07 PROCEDURE — A9500 TC99M SESTAMIBI: HCPCS | Performed by: RADIOLOGY

## 2024-09-07 PROCEDURE — 6360000002 HC RX W HCPCS

## 2024-09-07 PROCEDURE — 93005 ELECTROCARDIOGRAM TRACING: CPT

## 2024-09-07 PROCEDURE — 99285 EMERGENCY DEPT VISIT HI MDM: CPT

## 2024-09-07 PROCEDURE — 80053 COMPREHEN METABOLIC PANEL: CPT

## 2024-09-07 PROCEDURE — 3430000000 HC RX DIAGNOSTIC RADIOPHARMACEUTICAL: Performed by: RADIOLOGY

## 2024-09-07 PROCEDURE — 78452 HT MUSCLE IMAGE SPECT MULT: CPT

## 2024-09-07 PROCEDURE — 2060000000 HC ICU INTERMEDIATE R&B

## 2024-09-07 PROCEDURE — 6360000002 HC RX W HCPCS: Performed by: NURSE PRACTITIONER

## 2024-09-07 PROCEDURE — 96374 THER/PROPH/DIAG INJ IV PUSH: CPT

## 2024-09-07 PROCEDURE — 2580000003 HC RX 258: Performed by: NURSE PRACTITIONER

## 2024-09-07 PROCEDURE — 78452 HT MUSCLE IMAGE SPECT MULT: CPT | Performed by: INTERNAL MEDICINE

## 2024-09-07 PROCEDURE — 83605 ASSAY OF LACTIC ACID: CPT

## 2024-09-07 PROCEDURE — 82962 GLUCOSE BLOOD TEST: CPT

## 2024-09-07 PROCEDURE — 93017 CV STRESS TEST TRACING ONLY: CPT

## 2024-09-07 PROCEDURE — 83735 ASSAY OF MAGNESIUM: CPT

## 2024-09-07 PROCEDURE — 72125 CT NECK SPINE W/O DYE: CPT

## 2024-09-07 PROCEDURE — 93016 CV STRESS TEST SUPVJ ONLY: CPT | Performed by: INTERNAL MEDICINE

## 2024-09-07 PROCEDURE — 85025 COMPLETE CBC W/AUTO DIFF WBC: CPT

## 2024-09-07 PROCEDURE — 83036 HEMOGLOBIN GLYCOSYLATED A1C: CPT

## 2024-09-07 PROCEDURE — 2580000003 HC RX 258: Performed by: INTERNAL MEDICINE

## 2024-09-07 PROCEDURE — 71045 X-RAY EXAM CHEST 1 VIEW: CPT

## 2024-09-07 PROCEDURE — 96361 HYDRATE IV INFUSION ADD-ON: CPT

## 2024-09-07 PROCEDURE — 93018 CV STRESS TEST I&R ONLY: CPT | Performed by: INTERNAL MEDICINE

## 2024-09-07 PROCEDURE — 99222 1ST HOSP IP/OBS MODERATE 55: CPT | Performed by: NURSE PRACTITIONER

## 2024-09-07 PROCEDURE — 71275 CT ANGIOGRAPHY CHEST: CPT

## 2024-09-07 PROCEDURE — 93010 ELECTROCARDIOGRAM REPORT: CPT | Performed by: INTERNAL MEDICINE

## 2024-09-07 PROCEDURE — 87635 SARS-COV-2 COVID-19 AMP PRB: CPT

## 2024-09-07 PROCEDURE — 6360000004 HC RX CONTRAST MEDICATION: Performed by: RADIOLOGY

## 2024-09-07 PROCEDURE — 70450 CT HEAD/BRAIN W/O DYE: CPT

## 2024-09-07 PROCEDURE — 84484 ASSAY OF TROPONIN QUANT: CPT

## 2024-09-07 PROCEDURE — 6370000000 HC RX 637 (ALT 250 FOR IP): Performed by: NURSE PRACTITIONER

## 2024-09-07 PROCEDURE — 2580000003 HC RX 258: Performed by: EMERGENCY MEDICINE

## 2024-09-07 RX ORDER — SODIUM CHLORIDE 0.9 % (FLUSH) 0.9 %
5-40 SYRINGE (ML) INJECTION EVERY 12 HOURS SCHEDULED
Status: DISCONTINUED | OUTPATIENT
Start: 2024-09-07 | End: 2024-09-10 | Stop reason: HOSPADM

## 2024-09-07 RX ORDER — PRAVASTATIN SODIUM 40 MG
40 TABLET ORAL DAILY
COMMUNITY

## 2024-09-07 RX ORDER — SODIUM CHLORIDE 9 MG/ML
INJECTION, SOLUTION INTRAVENOUS CONTINUOUS
Status: DISCONTINUED | OUTPATIENT
Start: 2024-09-07 | End: 2024-09-07

## 2024-09-07 RX ORDER — FLUTICASONE PROPIONATE 50 MCG
2 SPRAY, SUSPENSION (ML) NASAL DAILY
COMMUNITY

## 2024-09-07 RX ORDER — IOPAMIDOL 755 MG/ML
75 INJECTION, SOLUTION INTRAVASCULAR
Status: COMPLETED | OUTPATIENT
Start: 2024-09-07 | End: 2024-09-07

## 2024-09-07 RX ORDER — LANOLIN ALCOHOL/MO/W.PET/CERES
3 CREAM (GRAM) TOPICAL NIGHTLY PRN
Status: DISCONTINUED | OUTPATIENT
Start: 2024-09-07 | End: 2024-09-10 | Stop reason: HOSPADM

## 2024-09-07 RX ORDER — 0.9 % SODIUM CHLORIDE 0.9 %
1000 INTRAVENOUS SOLUTION INTRAVENOUS ONCE
Status: COMPLETED | OUTPATIENT
Start: 2024-09-07 | End: 2024-09-07

## 2024-09-07 RX ORDER — POLYETHYLENE GLYCOL 3350 17 G/17G
17 POWDER, FOR SOLUTION ORAL DAILY PRN
Status: DISCONTINUED | OUTPATIENT
Start: 2024-09-07 | End: 2024-09-10 | Stop reason: HOSPADM

## 2024-09-07 RX ORDER — INSULIN LISPRO 100 [IU]/ML
0-4 INJECTION, SOLUTION INTRAVENOUS; SUBCUTANEOUS
Status: DISCONTINUED | OUTPATIENT
Start: 2024-09-07 | End: 2024-09-08

## 2024-09-07 RX ORDER — CLINDAMYCIN PHOSPHATE 600 MG/50ML
600 INJECTION, SOLUTION INTRAVENOUS ONCE
Status: COMPLETED | OUTPATIENT
Start: 2024-09-07 | End: 2024-09-07

## 2024-09-07 RX ORDER — TETRAKIS(2-METHOXYISOBUTYLISOCYANIDE)COPPER(I) TETRAFLUOROBORATE 1 MG/ML
30 INJECTION, POWDER, LYOPHILIZED, FOR SOLUTION INTRAVENOUS
Status: COMPLETED | OUTPATIENT
Start: 2024-09-07 | End: 2024-09-07

## 2024-09-07 RX ORDER — ONDANSETRON 2 MG/ML
4 INJECTION INTRAMUSCULAR; INTRAVENOUS ONCE
Status: COMPLETED | OUTPATIENT
Start: 2024-09-07 | End: 2024-09-07

## 2024-09-07 RX ORDER — ACETAMINOPHEN 650 MG/1
650 SUPPOSITORY RECTAL EVERY 6 HOURS PRN
Status: DISCONTINUED | OUTPATIENT
Start: 2024-09-07 | End: 2024-09-10 | Stop reason: HOSPADM

## 2024-09-07 RX ORDER — LEVETIRACETAM 500 MG/5ML
500 INJECTION, SOLUTION, CONCENTRATE INTRAVENOUS EVERY 12 HOURS
Status: DISCONTINUED | OUTPATIENT
Start: 2024-09-07 | End: 2024-09-08

## 2024-09-07 RX ORDER — ONDANSETRON 2 MG/ML
4 INJECTION INTRAMUSCULAR; INTRAVENOUS EVERY 6 HOURS PRN
Status: DISCONTINUED | OUTPATIENT
Start: 2024-09-07 | End: 2024-09-10 | Stop reason: HOSPADM

## 2024-09-07 RX ORDER — ENOXAPARIN SODIUM 100 MG/ML
40 INJECTION SUBCUTANEOUS DAILY
Status: DISCONTINUED | OUTPATIENT
Start: 2024-09-07 | End: 2024-09-10 | Stop reason: HOSPADM

## 2024-09-07 RX ORDER — LEVETIRACETAM 500 MG/5ML
1000 INJECTION, SOLUTION, CONCENTRATE INTRAVENOUS ONCE
Status: COMPLETED | OUTPATIENT
Start: 2024-09-07 | End: 2024-09-07

## 2024-09-07 RX ORDER — TETRAKIS(2-METHOXYISOBUTYLISOCYANIDE)COPPER(I) TETRAFLUOROBORATE 1 MG/ML
10 INJECTION, POWDER, LYOPHILIZED, FOR SOLUTION INTRAVENOUS
Status: COMPLETED | OUTPATIENT
Start: 2024-09-07 | End: 2024-09-07

## 2024-09-07 RX ORDER — REGADENOSON 0.08 MG/ML
0.4 INJECTION, SOLUTION INTRAVENOUS
Status: COMPLETED | OUTPATIENT
Start: 2024-09-07 | End: 2024-09-07

## 2024-09-07 RX ORDER — SODIUM CHLORIDE 9 MG/ML
INJECTION, SOLUTION INTRAVENOUS PRN
Status: DISCONTINUED | OUTPATIENT
Start: 2024-09-07 | End: 2024-09-10 | Stop reason: HOSPADM

## 2024-09-07 RX ORDER — CALCIUM CARBONATE 500 MG/1
500 TABLET, CHEWABLE ORAL 3 TIMES DAILY PRN
Status: DISCONTINUED | OUTPATIENT
Start: 2024-09-07 | End: 2024-09-10 | Stop reason: HOSPADM

## 2024-09-07 RX ORDER — SODIUM CHLORIDE 0.9 % (FLUSH) 0.9 %
5-40 SYRINGE (ML) INJECTION PRN
Status: DISCONTINUED | OUTPATIENT
Start: 2024-09-07 | End: 2024-09-10 | Stop reason: HOSPADM

## 2024-09-07 RX ORDER — PRAVASTATIN SODIUM 20 MG
40 TABLET ORAL NIGHTLY
Status: DISCONTINUED | OUTPATIENT
Start: 2024-09-07 | End: 2024-09-10 | Stop reason: HOSPADM

## 2024-09-07 RX ORDER — ASPIRIN 81 MG/1
81 TABLET ORAL DAILY
Status: DISCONTINUED | OUTPATIENT
Start: 2024-09-07 | End: 2024-09-10 | Stop reason: HOSPADM

## 2024-09-07 RX ORDER — SODIUM CHLORIDE 9 MG/ML
INJECTION, SOLUTION INTRAVENOUS CONTINUOUS
Status: ACTIVE | OUTPATIENT
Start: 2024-09-07 | End: 2024-09-07

## 2024-09-07 RX ORDER — INSULIN LISPRO 100 [IU]/ML
0-4 INJECTION, SOLUTION INTRAVENOUS; SUBCUTANEOUS NIGHTLY
Status: DISCONTINUED | OUTPATIENT
Start: 2024-09-07 | End: 2024-09-08

## 2024-09-07 RX ORDER — ONDANSETRON 4 MG/1
4 TABLET, ORALLY DISINTEGRATING ORAL EVERY 8 HOURS PRN
Status: DISCONTINUED | OUTPATIENT
Start: 2024-09-07 | End: 2024-09-10 | Stop reason: HOSPADM

## 2024-09-07 RX ORDER — ACETAMINOPHEN 325 MG/1
650 TABLET ORAL EVERY 6 HOURS PRN
Status: DISCONTINUED | OUTPATIENT
Start: 2024-09-07 | End: 2024-09-10 | Stop reason: HOSPADM

## 2024-09-07 RX ORDER — LORAZEPAM 2 MG/ML
4 INJECTION INTRAMUSCULAR EVERY 5 MIN PRN
Status: DISCONTINUED | OUTPATIENT
Start: 2024-09-07 | End: 2024-09-10 | Stop reason: HOSPADM

## 2024-09-07 RX ORDER — FAMOTIDINE 20 MG/1
20 TABLET, FILM COATED ORAL DAILY
Status: DISCONTINUED | OUTPATIENT
Start: 2024-09-07 | End: 2024-09-10 | Stop reason: HOSPADM

## 2024-09-07 RX ORDER — GLUCAGON 1 MG/ML
1 KIT INJECTION PRN
Status: DISCONTINUED | OUTPATIENT
Start: 2024-09-07 | End: 2024-09-10 | Stop reason: HOSPADM

## 2024-09-07 RX ORDER — DEXTROSE MONOHYDRATE 100 MG/ML
INJECTION, SOLUTION INTRAVENOUS CONTINUOUS PRN
Status: DISCONTINUED | OUTPATIENT
Start: 2024-09-07 | End: 2024-09-10 | Stop reason: HOSPADM

## 2024-09-07 RX ADMIN — CLINDAMYCIN PHOSPHATE 600 MG: 600 INJECTION, SOLUTION INTRAVENOUS at 07:35

## 2024-09-07 RX ADMIN — LEVETIRACETAM 1000 MG: 100 INJECTION INTRAVENOUS at 07:27

## 2024-09-07 RX ADMIN — SODIUM CHLORIDE 1000 ML: 9 INJECTION, SOLUTION INTRAVENOUS at 05:14

## 2024-09-07 RX ADMIN — LEVETIRACETAM 500 MG: 100 INJECTION INTRAVENOUS at 20:11

## 2024-09-07 RX ADMIN — PRAVASTATIN SODIUM 40 MG: 20 TABLET ORAL at 20:12

## 2024-09-07 RX ADMIN — ONDANSETRON 4 MG: 2 INJECTION INTRAMUSCULAR; INTRAVENOUS at 05:09

## 2024-09-07 RX ADMIN — Medication 10 MILLICURIE: at 11:18

## 2024-09-07 RX ADMIN — REGADENOSON 0.4 MG: 0.08 INJECTION, SOLUTION INTRAVENOUS at 13:29

## 2024-09-07 RX ADMIN — SODIUM CHLORIDE: 9 INJECTION, SOLUTION INTRAVENOUS at 10:19

## 2024-09-07 RX ADMIN — ENOXAPARIN SODIUM 40 MG: 100 INJECTION SUBCUTANEOUS at 09:45

## 2024-09-07 RX ADMIN — IOPAMIDOL 75 ML: 755 INJECTION, SOLUTION INTRAVENOUS at 06:36

## 2024-09-07 RX ADMIN — SODIUM CHLORIDE: 9 INJECTION, SOLUTION INTRAVENOUS at 08:12

## 2024-09-07 RX ADMIN — SODIUM CHLORIDE, PRESERVATIVE FREE 10 ML: 5 INJECTION INTRAVENOUS at 20:12

## 2024-09-07 RX ADMIN — SODIUM CHLORIDE 1000 ML: 9 INJECTION, SOLUTION INTRAVENOUS at 06:16

## 2024-09-07 RX ADMIN — Medication 35 MILLICURIE: at 13:31

## 2024-09-07 ASSESSMENT — PAIN - FUNCTIONAL ASSESSMENT
PAIN_FUNCTIONAL_ASSESSMENT: NONE - DENIES PAIN
PAIN_FUNCTIONAL_ASSESSMENT: NONE - DENIES PAIN

## 2024-09-08 PROBLEM — R29.898 WEAKNESS OF LEFT UPPER EXTREMITY: Status: ACTIVE | Noted: 2024-09-08

## 2024-09-08 PROBLEM — M54.12 CERVICAL RADICULOPATHY: Status: ACTIVE | Noted: 2024-09-08

## 2024-09-08 PROBLEM — F05 POSTICTAL CONFUSION: Status: ACTIVE | Noted: 2024-09-08

## 2024-09-08 PROBLEM — G40.209 COMPLEX PARTIAL SEIZURE EVOLVING TO GENERALIZED SEIZURE (HCC): Status: ACTIVE | Noted: 2024-09-08

## 2024-09-08 PROBLEM — R55 SYNCOPE AND COLLAPSE: Status: ACTIVE | Noted: 2024-09-08

## 2024-09-08 PROBLEM — G45.9 TIA (TRANSIENT ISCHEMIC ATTACK): Status: ACTIVE | Noted: 2024-09-08

## 2024-09-08 PROBLEM — M48.02 CERVICAL SPINAL STENOSIS: Status: ACTIVE | Noted: 2024-09-08

## 2024-09-08 PROBLEM — G62.9 PERIPHERAL POLYNEUROPATHY: Status: ACTIVE | Noted: 2024-09-08

## 2024-09-08 LAB
CHOLEST SERPL-MCNC: 213 MG/DL
GLUCOSE BLD-MCNC: 107 MG/DL (ref 74–99)
HDLC SERPL-MCNC: 58 MG/DL
LDLC SERPL CALC-MCNC: 127 MG/DL
TRIGL SERPL-MCNC: 139 MG/DL
VLDLC SERPL CALC-MCNC: 28 MG/DL

## 2024-09-08 PROCEDURE — 6360000002 HC RX W HCPCS: Performed by: INTERNAL MEDICINE

## 2024-09-08 PROCEDURE — 6370000000 HC RX 637 (ALT 250 FOR IP): Performed by: INTERNAL MEDICINE

## 2024-09-08 PROCEDURE — 84156 ASSAY OF PROTEIN URINE: CPT

## 2024-09-08 PROCEDURE — 2580000003 HC RX 258: Performed by: INTERNAL MEDICINE

## 2024-09-08 PROCEDURE — 82962 GLUCOSE BLOOD TEST: CPT

## 2024-09-08 PROCEDURE — 2580000003 HC RX 258: Performed by: PSYCHIATRY & NEUROLOGY

## 2024-09-08 PROCEDURE — 6370000000 HC RX 637 (ALT 250 FOR IP): Performed by: NURSE PRACTITIONER

## 2024-09-08 PROCEDURE — 84166 PROTEIN E-PHORESIS/URINE/CSF: CPT

## 2024-09-08 PROCEDURE — 36415 COLL VENOUS BLD VENIPUNCTURE: CPT

## 2024-09-08 PROCEDURE — 2060000000 HC ICU INTERMEDIATE R&B

## 2024-09-08 PROCEDURE — 80061 LIPID PANEL: CPT

## 2024-09-08 PROCEDURE — 81001 URINALYSIS AUTO W/SCOPE: CPT

## 2024-09-08 PROCEDURE — 6360000002 HC RX W HCPCS: Performed by: NURSE PRACTITIONER

## 2024-09-08 PROCEDURE — 99232 SBSQ HOSP IP/OBS MODERATE 35: CPT | Performed by: INTERNAL MEDICINE

## 2024-09-08 RX ORDER — SODIUM CHLORIDE 9 MG/ML
INJECTION, SOLUTION INTRAVENOUS CONTINUOUS
Status: DISCONTINUED | OUTPATIENT
Start: 2024-09-08 | End: 2024-09-09

## 2024-09-08 RX ORDER — LEVETIRACETAM 500 MG/5ML
1000 INJECTION, SOLUTION, CONCENTRATE INTRAVENOUS EVERY 12 HOURS
Status: DISCONTINUED | OUTPATIENT
Start: 2024-09-09 | End: 2024-09-10

## 2024-09-08 RX ADMIN — LEVETIRACETAM 500 MG: 100 INJECTION INTRAVENOUS at 07:35

## 2024-09-08 RX ADMIN — SODIUM CHLORIDE, PRESERVATIVE FREE 10 ML: 5 INJECTION INTRAVENOUS at 07:35

## 2024-09-08 RX ADMIN — ENOXAPARIN SODIUM 40 MG: 100 INJECTION SUBCUTANEOUS at 07:35

## 2024-09-08 RX ADMIN — FAMOTIDINE 20 MG: 20 TABLET, FILM COATED ORAL at 07:34

## 2024-09-08 RX ADMIN — ASPIRIN 81 MG: 81 TABLET, COATED ORAL at 07:34

## 2024-09-08 RX ADMIN — SODIUM CHLORIDE: 9 INJECTION, SOLUTION INTRAVENOUS at 22:45

## 2024-09-08 RX ADMIN — LEVETIRACETAM 500 MG: 100 INJECTION INTRAVENOUS at 20:02

## 2024-09-08 RX ADMIN — PRAVASTATIN SODIUM 40 MG: 20 TABLET ORAL at 20:03

## 2024-09-08 RX ADMIN — BENZOCAINE: 200 SPRAY DENTAL; ORAL; PERIODONTAL at 10:12

## 2024-09-08 RX ADMIN — SODIUM CHLORIDE, PRESERVATIVE FREE 10 ML: 5 INJECTION INTRAVENOUS at 20:03

## 2024-09-08 ASSESSMENT — PAIN DESCRIPTION - LOCATION: LOCATION: THROAT

## 2024-09-08 ASSESSMENT — PAIN SCALES - GENERAL: PAINLEVEL_OUTOF10: 3

## 2024-09-08 ASSESSMENT — PAIN DESCRIPTION - ORIENTATION: ORIENTATION: RIGHT

## 2024-09-08 ASSESSMENT — PAIN - FUNCTIONAL ASSESSMENT: PAIN_FUNCTIONAL_ASSESSMENT: PREVENTS OR INTERFERES SOME ACTIVE ACTIVITIES AND ADLS

## 2024-09-08 ASSESSMENT — PAIN DESCRIPTION - PAIN TYPE: TYPE: ACUTE PAIN

## 2024-09-08 ASSESSMENT — PAIN DESCRIPTION - DESCRIPTORS: DESCRIPTORS: ACHING;DISCOMFORT;BURNING

## 2024-09-08 ASSESSMENT — PAIN DESCRIPTION - ONSET: ONSET: ON-GOING

## 2024-09-08 ASSESSMENT — PAIN DESCRIPTION - FREQUENCY: FREQUENCY: CONTINUOUS

## 2024-09-09 ENCOUNTER — APPOINTMENT (OUTPATIENT)
Age: 79
DRG: 101 | End: 2024-09-09
Attending: PSYCHIATRY & NEUROLOGY
Payer: MEDICARE

## 2024-09-09 ENCOUNTER — APPOINTMENT (OUTPATIENT)
Dept: CT IMAGING | Age: 79
DRG: 101 | End: 2024-09-09
Attending: PSYCHIATRY & NEUROLOGY
Payer: MEDICARE

## 2024-09-09 ENCOUNTER — APPOINTMENT (OUTPATIENT)
Dept: NEUROLOGY | Age: 79
DRG: 101 | End: 2024-09-09
Payer: MEDICARE

## 2024-09-09 LAB
ALBUMIN SERPL-MCNC: 3.1 G/DL (ref 3.5–4.7)
ALPHA1 GLOB SERPL ELPH-MCNC: 0.2 G/DL (ref 0.2–0.4)
ALPHA2 GLOB SERPL ELPH-MCNC: 0.7 G/DL (ref 0.5–1)
ANION GAP SERPL CALCULATED.3IONS-SCNC: 11 MMOL/L (ref 7–16)
ANION GAP SERPL CALCULATED.3IONS-SCNC: 21 MMOL/L (ref 7–16)
B-GLOBULIN SERPL ELPH-MCNC: 0.9 G/DL (ref 0.8–1.3)
BASOPHILS # BLD: 0.06 K/UL (ref 0–0.2)
BASOPHILS NFR BLD: 1 % (ref 0–2)
BILIRUB UR QL STRIP: NEGATIVE
BUN SERPL-MCNC: 10 MG/DL (ref 6–23)
BUN SERPL-MCNC: 12 MG/DL (ref 6–23)
CALCIUM SERPL-MCNC: 8.6 MG/DL (ref 8.6–10.2)
CALCIUM SERPL-MCNC: 9.2 MG/DL (ref 8.6–10.2)
CHLORIDE SERPL-SCNC: 104 MMOL/L (ref 98–107)
CHLORIDE SERPL-SCNC: 108 MMOL/L (ref 98–107)
CLARITY UR: CLEAR
CO2 SERPL-SCNC: 17 MMOL/L (ref 22–29)
CO2 SERPL-SCNC: 26 MMOL/L (ref 22–29)
COLOR UR: YELLOW
CREAT SERPL-MCNC: 0.8 MG/DL (ref 0.7–1.2)
CREAT SERPL-MCNC: 0.9 MG/DL (ref 0.7–1.2)
CRP SERPL HS-MCNC: 10 MG/L (ref 0–5)
ECHO AO ASC DIAM: 2.9 CM
ECHO AO ASCENDING AORTA INDEX: 1.49 CM/M2
ECHO AO SINUS VALSALVA DIAM: 2.5 CM
ECHO AO SINUS VALSALVA INDEX: 1.28 CM/M2
ECHO AR MAX VEL PISA: 3.4 M/S
ECHO AV AREA PEAK VELOCITY: 1.9 CM2
ECHO AV AREA VTI: 2 CM2
ECHO AV AREA/BSA PEAK VELOCITY: 1 CM2/M2
ECHO AV AREA/BSA VTI: 1 CM2/M2
ECHO AV CUSP MM: 1.3 CM
ECHO AV MEAN GRADIENT: 7 MMHG
ECHO AV MEAN VELOCITY: 1.3 M/S
ECHO AV PEAK GRADIENT: 13 MMHG
ECHO AV PEAK VELOCITY: 1.8 M/S
ECHO AV REGURGITANT PHT: 600.9 MILLISECOND
ECHO AV VELOCITY RATIO: 0.61
ECHO AV VTI: 35.2 CM
ECHO BSA: 1.98 M2
ECHO EST RA PRESSURE: 3 MMHG
ECHO LA DIAMETER INDEX: 1.33 CM/M2
ECHO LA DIAMETER: 2.6 CM
ECHO LA VOL A-L A2C: 27 ML (ref 18–58)
ECHO LA VOL A-L A4C: 26 ML (ref 18–58)
ECHO LA VOL MOD A2C: 25 ML (ref 18–58)
ECHO LA VOL MOD A4C: 20 ML (ref 18–58)
ECHO LA VOLUME AREA LENGTH: 28 ML
ECHO LA VOLUME INDEX A-L A2C: 14 ML/M2 (ref 16–34)
ECHO LA VOLUME INDEX A-L A4C: 13 ML/M2 (ref 16–34)
ECHO LA VOLUME INDEX AREA LENGTH: 14 ML/M2 (ref 16–34)
ECHO LA VOLUME INDEX MOD A2C: 13 ML/M2 (ref 16–34)
ECHO LA VOLUME INDEX MOD A4C: 10 ML/M2 (ref 16–34)
ECHO LV EDV A2C: 89 ML
ECHO LV EDV A4C: 95 ML
ECHO LV EDV BP: 95 ML (ref 67–155)
ECHO LV EDV INDEX A4C: 49 ML/M2
ECHO LV EDV INDEX BP: 49 ML/M2
ECHO LV EDV NDEX A2C: 46 ML/M2
ECHO LV EJECTION FRACTION A2C: 63 %
ECHO LV EJECTION FRACTION A4C: 58 %
ECHO LV EJECTION FRACTION BIPLANE: 58 % (ref 55–100)
ECHO LV ESV A2C: 33 ML
ECHO LV ESV A4C: 40 ML
ECHO LV ESV BP: 38 ML (ref 22–58)
ECHO LV ESV INDEX A2C: 17 ML/M2
ECHO LV ESV INDEX A4C: 21 ML/M2
ECHO LV ESV INDEX BP: 19 ML/M2
ECHO LV FRACTIONAL SHORTENING: 37 % (ref 28–44)
ECHO LV INTERNAL DIMENSION DIASTOLE INDEX: 2.36 CM/M2
ECHO LV INTERNAL DIMENSION DIASTOLIC: 4.6 CM (ref 4.2–5.9)
ECHO LV INTERNAL DIMENSION SYSTOLIC INDEX: 1.49 CM/M2
ECHO LV INTERNAL DIMENSION SYSTOLIC: 2.9 CM
ECHO LV ISOVOLUMETRIC RELAXATION TIME (IVRT): 124.6 MS
ECHO LV IVSD: 1.1 CM (ref 0.6–1)
ECHO LV MASS 2D: 169.9 G (ref 88–224)
ECHO LV MASS INDEX 2D: 87.1 G/M2 (ref 49–115)
ECHO LV POSTERIOR WALL DIASTOLIC: 1 CM (ref 0.6–1)
ECHO LV RELATIVE WALL THICKNESS RATIO: 0.43
ECHO LVOT AREA: 3.1 CM2
ECHO LVOT AV VTI INDEX: 0.61
ECHO LVOT DIAM: 2 CM
ECHO LVOT MEAN GRADIENT: 2 MMHG
ECHO LVOT PEAK GRADIENT: 5 MMHG
ECHO LVOT PEAK VELOCITY: 1.1 M/S
ECHO LVOT STROKE VOLUME INDEX: 34.8 ML/M2
ECHO LVOT SV: 67.8 ML
ECHO LVOT VTI: 21.6 CM
ECHO MV "A" WAVE DURATION: 101.5 MSEC
ECHO MV A VELOCITY: 0.93 M/S
ECHO MV AREA PHT: 3.3 CM2
ECHO MV AREA VTI: 2.8 CM2
ECHO MV E DECELERATION TIME (DT): 210.7 MS
ECHO MV E VELOCITY: 0.69 M/S
ECHO MV E/A RATIO: 0.74
ECHO MV LVOT VTI INDEX: 1.11
ECHO MV MAX VELOCITY: 0.9 M/S
ECHO MV MEAN GRADIENT: 1 MMHG
ECHO MV MEAN VELOCITY: 0.6 M/S
ECHO MV PEAK GRADIENT: 3 MMHG
ECHO MV PRESSURE HALF TIME (PHT): 66.2 MS
ECHO MV VTI: 23.9 CM
ECHO PV MAX VELOCITY: 0.8 M/S
ECHO PV MEAN GRADIENT: 2 MMHG
ECHO PV MEAN VELOCITY: 0.6 M/S
ECHO PV PEAK GRADIENT: 3 MMHG
ECHO PV VTI: 16.3 CM
ECHO RIGHT VENTRICULAR SYSTOLIC PRESSURE (RVSP): 25 MMHG
ECHO RV INTERNAL DIMENSION: 4.5 CM
ECHO RV TAPSE: 1.8 CM (ref 1.7–?)
ECHO TV REGURGITANT MAX VELOCITY: 2.33 M/S
ECHO TV REGURGITANT PEAK GRADIENT: 22 MMHG
EOSINOPHIL # BLD: 0.17 K/UL (ref 0.05–0.5)
EOSINOPHILS RELATIVE PERCENT: 3 % (ref 0–6)
ERYTHROCYTE [DISTWIDTH] IN BLOOD BY AUTOMATED COUNT: 11.8 % (ref 11.5–15)
ERYTHROCYTE [SEDIMENTATION RATE] IN BLOOD BY WESTERGREN METHOD: 13 MM/HR (ref 0–15)
FOLATE SERPL-MCNC: >20 NG/ML (ref 4.8–24.2)
GAMMA GLOB SERPL ELPH-MCNC: 0.8 G/DL (ref 0.7–1.6)
GFR, ESTIMATED: 87 ML/MIN/1.73M2
GFR, ESTIMATED: >90 ML/MIN/1.73M2
GLUCOSE SERPL-MCNC: 103 MG/DL (ref 74–99)
GLUCOSE SERPL-MCNC: 138 MG/DL (ref 74–99)
GLUCOSE UR STRIP-MCNC: NEGATIVE MG/DL
HBA1C MFR BLD: 5.9 % (ref 4–5.6)
HCT VFR BLD AUTO: 38.6 % (ref 37–54)
HCYS SERPL-SCNC: 6.8 UMOL/L (ref 0–15)
HGB BLD-MCNC: 13.5 G/DL (ref 12.5–16.5)
HGB UR QL STRIP.AUTO: ABNORMAL
IMM GRANULOCYTES # BLD AUTO: <0.03 K/UL (ref 0–0.58)
IMM GRANULOCYTES NFR BLD: 0 % (ref 0–5)
INR PPP: 1
INTERPRETATION SERPL IFE-IMP: NORMAL
IRON SATN MFR SERPL: 51 % (ref 20–55)
IRON SERPL-MCNC: 123 UG/DL (ref 59–158)
KETONES UR STRIP-MCNC: NEGATIVE MG/DL
LACTATE BLDV-SCNC: 1.2 MMOL/L (ref 0.5–2.2)
LEUKOCYTE ESTERASE UR QL STRIP: NEGATIVE
LYMPHOCYTES NFR BLD: 2.04 K/UL (ref 1.5–4)
LYMPHOCYTES RELATIVE PERCENT: 41 % (ref 20–42)
MCH RBC QN AUTO: 32.6 PG (ref 26–35)
MCHC RBC AUTO-ENTMCNC: 35 G/DL (ref 32–34.5)
MCV RBC AUTO: 93.2 FL (ref 80–99.9)
MONOCYTES NFR BLD: 0.6 K/UL (ref 0.1–0.95)
MONOCYTES NFR BLD: 12 % (ref 2–12)
NEUTROPHILS NFR BLD: 42 % (ref 43–80)
NEUTS SEG NFR BLD: 2.11 K/UL (ref 1.8–7.3)
NITRITE UR QL STRIP: NEGATIVE
PARTIAL THROMBOPLASTIN TIME: 25.8 SEC (ref 24.5–35.1)
PATH REV: NORMAL
PATHOLOGIST: ABNORMAL
PH UR STRIP: 6 [PH] (ref 5–9)
PLATELET # BLD AUTO: 112 K/UL (ref 130–450)
PMV BLD AUTO: 12.1 FL (ref 7–12)
POTASSIUM SERPL-SCNC: 3.7 MMOL/L (ref 3.5–5)
POTASSIUM SERPL-SCNC: 3.8 MMOL/L (ref 3.5–5)
PROCALCITONIN SERPL-MCNC: <0.02 NG/ML (ref 0–0.08)
PROT PATTERN SERPL ELPH-IMP: ABNORMAL
PROT SERPL-MCNC: 5.7 G/DL (ref 6.4–8.3)
PROT UR STRIP-MCNC: NEGATIVE MG/DL
PROTHROMBIN TIME: 11.3 SEC (ref 9.3–12.4)
PSA SERPL-MCNC: 1 NG/ML (ref 0–4)
RBC # BLD AUTO: 4.14 M/UL (ref 3.8–5.8)
RBC #/AREA URNS HPF: ABNORMAL /HPF
RPR SER QL: NONREACTIVE
SODIUM SERPL-SCNC: 141 MMOL/L (ref 132–146)
SODIUM SERPL-SCNC: 146 MMOL/L (ref 132–146)
SP GR UR STRIP: 1.01 (ref 1–1.03)
T3FREE SERPL-MCNC: 2.39 PG/ML (ref 2–4.4)
T4 FREE SERPL-MCNC: 1 NG/DL (ref 0.9–1.7)
TIBC SERPL-MCNC: 240 UG/DL (ref 250–450)
TSH SERPL DL<=0.05 MIU/L-ACNC: 1.74 UIU/ML (ref 0.27–4.2)
UROBILINOGEN UR STRIP-ACNC: 0.2 EU/DL (ref 0–1)
VIT B12 SERPL-MCNC: 617 PG/ML (ref 211–946)
WBC #/AREA URNS HPF: ABNORMAL /HPF
WBC OTHER # BLD: 5 K/UL (ref 4.5–11.5)

## 2024-09-09 PROCEDURE — 95819 EEG AWAKE AND ASLEEP: CPT | Performed by: PSYCHIATRY & NEUROLOGY

## 2024-09-09 PROCEDURE — 84443 ASSAY THYROID STIM HORMONE: CPT

## 2024-09-09 PROCEDURE — 83540 ASSAY OF IRON: CPT

## 2024-09-09 PROCEDURE — 83036 HEMOGLOBIN GLYCOSYLATED A1C: CPT

## 2024-09-09 PROCEDURE — 83550 IRON BINDING TEST: CPT

## 2024-09-09 PROCEDURE — 84425 ASSAY OF VITAMIN B-1: CPT

## 2024-09-09 PROCEDURE — 95816 EEG AWAKE AND DROWSY: CPT

## 2024-09-09 PROCEDURE — 84145 PROCALCITONIN (PCT): CPT

## 2024-09-09 PROCEDURE — 84439 ASSAY OF FREE THYROXINE: CPT

## 2024-09-09 PROCEDURE — 93306 TTE W/DOPPLER COMPLETE: CPT | Performed by: INTERNAL MEDICINE

## 2024-09-09 PROCEDURE — 84155 ASSAY OF PROTEIN SERUM: CPT

## 2024-09-09 PROCEDURE — 83825 ASSAY OF MERCURY: CPT

## 2024-09-09 PROCEDURE — 83655 ASSAY OF LEAD: CPT

## 2024-09-09 PROCEDURE — 86592 SYPHILIS TEST NON-TREP QUAL: CPT

## 2024-09-09 PROCEDURE — 70496 CT ANGIOGRAPHY HEAD: CPT

## 2024-09-09 PROCEDURE — 6360000002 HC RX W HCPCS: Performed by: INTERNAL MEDICINE

## 2024-09-09 PROCEDURE — 82746 ASSAY OF FOLIC ACID SERUM: CPT

## 2024-09-09 PROCEDURE — 6360000002 HC RX W HCPCS: Performed by: PSYCHIATRY & NEUROLOGY

## 2024-09-09 PROCEDURE — G0103 PSA SCREENING: HCPCS

## 2024-09-09 PROCEDURE — 86140 C-REACTIVE PROTEIN: CPT

## 2024-09-09 PROCEDURE — 84481 FREE ASSAY (FT-3): CPT

## 2024-09-09 PROCEDURE — 36415 COLL VENOUS BLD VENIPUNCTURE: CPT

## 2024-09-09 PROCEDURE — 99232 SBSQ HOSP IP/OBS MODERATE 35: CPT | Performed by: INTERNAL MEDICINE

## 2024-09-09 PROCEDURE — 85025 COMPLETE CBC W/AUTO DIFF WBC: CPT

## 2024-09-09 PROCEDURE — 83605 ASSAY OF LACTIC ACID: CPT

## 2024-09-09 PROCEDURE — 83090 ASSAY OF HOMOCYSTEINE: CPT

## 2024-09-09 PROCEDURE — 85652 RBC SED RATE AUTOMATED: CPT

## 2024-09-09 PROCEDURE — 84165 PROTEIN E-PHORESIS SERUM: CPT

## 2024-09-09 PROCEDURE — 70498 CT ANGIOGRAPHY NECK: CPT

## 2024-09-09 PROCEDURE — 2580000003 HC RX 258: Performed by: RADIOLOGY

## 2024-09-09 PROCEDURE — 2060000000 HC ICU INTERMEDIATE R&B

## 2024-09-09 PROCEDURE — 84207 ASSAY OF VITAMIN B-6: CPT

## 2024-09-09 PROCEDURE — 85610 PROTHROMBIN TIME: CPT

## 2024-09-09 PROCEDURE — 6360000004 HC RX CONTRAST MEDICATION: Performed by: RADIOLOGY

## 2024-09-09 PROCEDURE — 85730 THROMBOPLASTIN TIME PARTIAL: CPT

## 2024-09-09 PROCEDURE — C8929 TTE W OR WO FOL WCON,DOPPLER: HCPCS

## 2024-09-09 PROCEDURE — 82607 VITAMIN B-12: CPT

## 2024-09-09 PROCEDURE — 82175 ASSAY OF ARSENIC: CPT

## 2024-09-09 PROCEDURE — 99232 SBSQ HOSP IP/OBS MODERATE 35: CPT

## 2024-09-09 PROCEDURE — 80048 BASIC METABOLIC PNL TOTAL CA: CPT

## 2024-09-09 PROCEDURE — 6370000000 HC RX 637 (ALT 250 FOR IP): Performed by: NURSE PRACTITIONER

## 2024-09-09 PROCEDURE — 86334 IMMUNOFIX E-PHORESIS SERUM: CPT

## 2024-09-09 PROCEDURE — 83921 ORGANIC ACID SINGLE QUANT: CPT

## 2024-09-09 RX ORDER — IOPAMIDOL 755 MG/ML
60 INJECTION, SOLUTION INTRAVASCULAR
Status: COMPLETED | OUTPATIENT
Start: 2024-09-09 | End: 2024-09-09

## 2024-09-09 RX ORDER — SODIUM CHLORIDE 0.9 % (FLUSH) 0.9 %
10 SYRINGE (ML) INJECTION
Status: COMPLETED | OUTPATIENT
Start: 2024-09-09 | End: 2024-09-09

## 2024-09-09 RX ADMIN — LEVETIRACETAM 1000 MG: 100 INJECTION INTRAVENOUS at 20:50

## 2024-09-09 RX ADMIN — FAMOTIDINE 20 MG: 20 TABLET, FILM COATED ORAL at 08:29

## 2024-09-09 RX ADMIN — ASPIRIN 81 MG: 81 TABLET, COATED ORAL at 08:29

## 2024-09-09 RX ADMIN — Medication 10 ML: at 07:22

## 2024-09-09 RX ADMIN — IOPAMIDOL 60 ML: 755 INJECTION, SOLUTION INTRAVENOUS at 07:22

## 2024-09-09 RX ADMIN — ENOXAPARIN SODIUM 40 MG: 100 INJECTION SUBCUTANEOUS at 08:30

## 2024-09-09 RX ADMIN — LEVETIRACETAM 1000 MG: 100 INJECTION INTRAVENOUS at 08:29

## 2024-09-09 RX ADMIN — BENZOCAINE: 200 SPRAY DENTAL; ORAL; PERIODONTAL at 08:39

## 2024-09-09 RX ADMIN — PRAVASTATIN SODIUM 40 MG: 20 TABLET ORAL at 20:50

## 2024-09-09 ASSESSMENT — PAIN DESCRIPTION - DESCRIPTORS: DESCRIPTORS: DISCOMFORT;TENDER

## 2024-09-09 ASSESSMENT — PAIN SCALES - GENERAL: PAINLEVEL_OUTOF10: 2

## 2024-09-09 ASSESSMENT — PAIN DESCRIPTION - ONSET: ONSET: ON-GOING

## 2024-09-09 ASSESSMENT — PAIN DESCRIPTION - PAIN TYPE: TYPE: ACUTE PAIN

## 2024-09-09 ASSESSMENT — PAIN DESCRIPTION - FREQUENCY: FREQUENCY: CONTINUOUS

## 2024-09-09 ASSESSMENT — PAIN DESCRIPTION - LOCATION: LOCATION: OTHER (COMMENT)

## 2024-09-09 ASSESSMENT — PAIN - FUNCTIONAL ASSESSMENT: PAIN_FUNCTIONAL_ASSESSMENT: ACTIVITIES ARE NOT PREVENTED

## 2024-09-09 ASSESSMENT — PAIN DESCRIPTION - ORIENTATION: ORIENTATION: RIGHT

## 2024-09-10 ENCOUNTER — APPOINTMENT (OUTPATIENT)
Dept: MRI IMAGING | Age: 79
DRG: 101 | End: 2024-09-10
Payer: MEDICARE

## 2024-09-10 VITALS
HEIGHT: 68 IN | WEIGHT: 180 LBS | OXYGEN SATURATION: 94 % | SYSTOLIC BLOOD PRESSURE: 135 MMHG | TEMPERATURE: 97 F | BODY MASS INDEX: 27.28 KG/M2 | DIASTOLIC BLOOD PRESSURE: 66 MMHG | HEART RATE: 66 BPM | RESPIRATION RATE: 15 BRPM

## 2024-09-10 LAB
ANION GAP SERPL CALCULATED.3IONS-SCNC: 11 MMOL/L (ref 7–16)
BASOPHILS # BLD: 0.04 K/UL (ref 0–0.2)
BASOPHILS NFR BLD: 1 % (ref 0–2)
BUN SERPL-MCNC: 12 MG/DL (ref 6–23)
CALCIUM SERPL-MCNC: 9.1 MG/DL (ref 8.6–10.2)
CHLORIDE SERPL-SCNC: 106 MMOL/L (ref 98–107)
CO2 SERPL-SCNC: 25 MMOL/L (ref 22–29)
CREAT SERPL-MCNC: 0.8 MG/DL (ref 0.7–1.2)
EOSINOPHIL # BLD: 0.22 K/UL (ref 0.05–0.5)
EOSINOPHILS RELATIVE PERCENT: 4 % (ref 0–6)
ERYTHROCYTE [DISTWIDTH] IN BLOOD BY AUTOMATED COUNT: 11.8 % (ref 11.5–15)
GFR, ESTIMATED: >90 ML/MIN/1.73M2
GLUCOSE SERPL-MCNC: 107 MG/DL (ref 74–99)
HCT VFR BLD AUTO: 38.8 % (ref 37–54)
HGB BLD-MCNC: 14.1 G/DL (ref 12.5–16.5)
IMM GRANULOCYTES # BLD AUTO: <0.03 K/UL (ref 0–0.58)
IMM GRANULOCYTES NFR BLD: 0 % (ref 0–5)
LYMPHOCYTES NFR BLD: 2.11 K/UL (ref 1.5–4)
LYMPHOCYTES RELATIVE PERCENT: 35 % (ref 20–42)
MCH RBC QN AUTO: 34.1 PG (ref 26–35)
MCHC RBC AUTO-ENTMCNC: 36.3 G/DL (ref 32–34.5)
MCV RBC AUTO: 93.9 FL (ref 80–99.9)
MONOCYTES NFR BLD: 0.77 K/UL (ref 0.1–0.95)
MONOCYTES NFR BLD: 13 % (ref 2–12)
NEUTROPHILS NFR BLD: 47 % (ref 43–80)
NEUTS SEG NFR BLD: 2.81 K/UL (ref 1.8–7.3)
PLATELET # BLD AUTO: 140 K/UL (ref 130–450)
PMV BLD AUTO: 11.3 FL (ref 7–12)
POTASSIUM SERPL-SCNC: 3.8 MMOL/L (ref 3.5–5)
RBC # BLD AUTO: 4.13 M/UL (ref 3.8–5.8)
SODIUM SERPL-SCNC: 142 MMOL/L (ref 132–146)
WBC OTHER # BLD: 6 K/UL (ref 4.5–11.5)

## 2024-09-10 PROCEDURE — 70553 MRI BRAIN STEM W/O & W/DYE: CPT

## 2024-09-10 PROCEDURE — 6370000000 HC RX 637 (ALT 250 FOR IP): Performed by: NURSE PRACTITIONER

## 2024-09-10 PROCEDURE — 72141 MRI NECK SPINE W/O DYE: CPT

## 2024-09-10 PROCEDURE — 6360000004 HC RX CONTRAST MEDICATION: Performed by: RADIOLOGY

## 2024-09-10 PROCEDURE — 99239 HOSP IP/OBS DSCHRG MGMT >30: CPT | Performed by: INTERNAL MEDICINE

## 2024-09-10 PROCEDURE — 2580000003 HC RX 258: Performed by: INTERNAL MEDICINE

## 2024-09-10 PROCEDURE — 99232 SBSQ HOSP IP/OBS MODERATE 35: CPT

## 2024-09-10 PROCEDURE — 6360000002 HC RX W HCPCS: Performed by: PSYCHIATRY & NEUROLOGY

## 2024-09-10 PROCEDURE — 6360000002 HC RX W HCPCS: Performed by: INTERNAL MEDICINE

## 2024-09-10 PROCEDURE — 80048 BASIC METABOLIC PNL TOTAL CA: CPT

## 2024-09-10 PROCEDURE — 36415 COLL VENOUS BLD VENIPUNCTURE: CPT

## 2024-09-10 PROCEDURE — 85025 COMPLETE CBC W/AUTO DIFF WBC: CPT

## 2024-09-10 PROCEDURE — A9577 INJ MULTIHANCE: HCPCS | Performed by: RADIOLOGY

## 2024-09-10 PROCEDURE — 6370000000 HC RX 637 (ALT 250 FOR IP)

## 2024-09-10 RX ORDER — LEVETIRACETAM 1000 MG/1
1000 TABLET ORAL 2 TIMES DAILY
Qty: 60 TABLET | Refills: 3 | Status: SHIPPED | OUTPATIENT
Start: 2024-09-10 | End: 2024-09-12 | Stop reason: SDUPTHER

## 2024-09-10 RX ORDER — LEVETIRACETAM 500 MG/1
1000 TABLET ORAL 2 TIMES DAILY
Status: DISCONTINUED | OUTPATIENT
Start: 2024-09-10 | End: 2024-09-10 | Stop reason: HOSPADM

## 2024-09-10 RX ADMIN — FAMOTIDINE 20 MG: 20 TABLET, FILM COATED ORAL at 09:14

## 2024-09-10 RX ADMIN — LEVETIRACETAM 1000 MG: 500 TABLET, FILM COATED ORAL at 19:47

## 2024-09-10 RX ADMIN — ASPIRIN 81 MG: 81 TABLET, COATED ORAL at 09:15

## 2024-09-10 RX ADMIN — SODIUM CHLORIDE, PRESERVATIVE FREE 10 ML: 5 INJECTION INTRAVENOUS at 09:15

## 2024-09-10 RX ADMIN — ENOXAPARIN SODIUM 40 MG: 100 INJECTION SUBCUTANEOUS at 09:15

## 2024-09-10 RX ADMIN — LEVETIRACETAM 1000 MG: 100 INJECTION INTRAVENOUS at 09:14

## 2024-09-10 RX ADMIN — PRAVASTATIN SODIUM 40 MG: 20 TABLET ORAL at 19:47

## 2024-09-10 RX ADMIN — GADOBENATE DIMEGLUMINE 17 ML: 529 INJECTION, SOLUTION INTRAVENOUS at 18:06

## 2024-09-10 ASSESSMENT — PAIN SCALES - GENERAL
PAINLEVEL_OUTOF10: 0

## 2024-09-11 ENCOUNTER — TELEPHONE (OUTPATIENT)
Dept: PRIMARY CARE CLINIC | Age: 79
End: 2024-09-11

## 2024-09-11 ENCOUNTER — TELEPHONE (OUTPATIENT)
Dept: ADMINISTRATIVE | Age: 79
End: 2024-09-11

## 2024-09-11 ENCOUNTER — CARE COORDINATION (OUTPATIENT)
Dept: CARE COORDINATION | Age: 79
End: 2024-09-11

## 2024-09-11 DIAGNOSIS — G40.209 COMPLEX PARTIAL SEIZURE EVOLVING TO GENERALIZED SEIZURE (HCC): Primary | ICD-10-CM

## 2024-09-11 LAB
ARSENIC BLD-MCNC: <10 UG/L
LEAD BLDV-MCNC: <2 UG/DL
MERCURY BLD-MCNC: <2.5 UG/L
PYRIDOXAL PHOS SERPL-SCNC: 129.6 NMOL/L (ref 20–125)
VITAMIN D2 AND D3, TOTAL: 22.5 NG/ML (ref 30–80)
VITAMIN D2, 25 HYDROXY: <1 NG/ML
VITAMIN D3,25 HYDROXY: 22.5 NG/ML

## 2024-09-11 PROCEDURE — 1111F DSCHRG MED/CURRENT MED MERGE: CPT | Performed by: FAMILY MEDICINE

## 2024-09-12 ENCOUNTER — TELEPHONE (OUTPATIENT)
Dept: ADMINISTRATIVE | Age: 79
End: 2024-09-12

## 2024-09-12 ENCOUNTER — OFFICE VISIT (OUTPATIENT)
Dept: PRIMARY CARE CLINIC | Age: 79
End: 2024-09-12

## 2024-09-12 VITALS
HEIGHT: 68 IN | WEIGHT: 180 LBS | TEMPERATURE: 98.4 F | OXYGEN SATURATION: 97 % | HEART RATE: 72 BPM | BODY MASS INDEX: 27.28 KG/M2

## 2024-09-12 DIAGNOSIS — E11.9 TYPE 2 DIABETES MELLITUS WITHOUT COMPLICATION, WITHOUT LONG-TERM CURRENT USE OF INSULIN (HCC): ICD-10-CM

## 2024-09-12 DIAGNOSIS — R56.9 SEIZURE (HCC): Primary | ICD-10-CM

## 2024-09-12 DIAGNOSIS — E78.00 PURE HYPERCHOLESTEROLEMIA: ICD-10-CM

## 2024-09-12 DIAGNOSIS — Z09 HOSPITAL DISCHARGE FOLLOW-UP: ICD-10-CM

## 2024-09-12 LAB — VIT B1 PYROPHOSHATE BLD-SCNC: 150 NMOL/L (ref 70–180)

## 2024-09-12 RX ORDER — LEVETIRACETAM 1000 MG/1
1000 TABLET ORAL 2 TIMES DAILY
Qty: 180 TABLET | Refills: 1 | Status: SHIPPED | OUTPATIENT
Start: 2024-09-12

## 2024-09-13 LAB — METHYLMALONATE SERPL-SCNC: 0.1 UMOL/L (ref 0–0.4)

## 2024-09-18 ENCOUNTER — CARE COORDINATION (OUTPATIENT)
Dept: CARE COORDINATION | Age: 79
End: 2024-09-18

## 2024-09-20 LAB
ALBUMIN: 4.1 G/DL
ALP BLD-CCNC: 62 U/L
ALT SERPL-CCNC: 32 U/L
ANION GAP SERPL CALCULATED.3IONS-SCNC: NORMAL MMOL/L
AST SERPL-CCNC: 20 U/L
BASOPHILS ABSOLUTE: 61 /ΜL
BASOPHILS RELATIVE PERCENT: 1.1 %
BILIRUB SERPL-MCNC: 0.5 MG/DL (ref 0.1–1.4)
BUN BLDV-MCNC: 15 MG/DL
C-REACTIVE PROTEIN: <3
CALCIUM SERPL-MCNC: 9.2 MG/DL
CHLORIDE BLD-SCNC: 105 MMOL/L
CO2: 28 MMOL/L
CREAT SERPL-MCNC: 0.79 MG/DL
EOSINOPHILS ABSOLUTE: 187 /ΜL
EOSINOPHILS RELATIVE PERCENT: 3.4 %
GFR, ESTIMATED: 92
GLUCOSE BLD-MCNC: 111 MG/DL
HCT VFR BLD CALC: 41.9 % (ref 41–53)
HEMOGLOBIN: 14.4 G/DL (ref 13.5–17.5)
LYMPHOCYTES ABSOLUTE: 2167 /ΜL
LYMPHOCYTES RELATIVE PERCENT: 39.4 %
MCH RBC QN AUTO: 33 PG
MCHC RBC AUTO-ENTMCNC: 34.4 G/DL
MCV RBC AUTO: 96.1 FL
MONOCYTES ABSOLUTE: 809 /ΜL
MONOCYTES RELATIVE PERCENT: 14.7 %
NEUTROPHILS ABSOLUTE: 2277 /ΜL
NEUTROPHILS RELATIVE PERCENT: 41.4 %
PDW BLD-RTO: 12.1 %
PLATELET # BLD: 165 K/ΜL
PMV BLD AUTO: 10.6 FL
POTASSIUM SERPL-SCNC: 4.4 MMOL/L
RBC # BLD: 4.36 10^6/ΜL
SODIUM BLD-SCNC: 140 MMOL/L
T4 TOTAL: 5.9
TOTAL PROTEIN: 6.4 G/DL (ref 6.4–8.2)
TSH SERPL DL<=0.05 MIU/L-ACNC: 1.27 UIU/ML
WBC # BLD: 5.5 10^3/ML

## 2024-09-23 ENCOUNTER — OFFICE VISIT (OUTPATIENT)
Dept: PRIMARY CARE CLINIC | Age: 79
End: 2024-09-23
Payer: MEDICARE

## 2024-09-23 VITALS
HEART RATE: 70 BPM | SYSTOLIC BLOOD PRESSURE: 120 MMHG | DIASTOLIC BLOOD PRESSURE: 70 MMHG | BODY MASS INDEX: 28.19 KG/M2 | OXYGEN SATURATION: 96 % | TEMPERATURE: 97.9 F | WEIGHT: 186 LBS | HEIGHT: 68 IN

## 2024-09-23 DIAGNOSIS — E11.9 TYPE 2 DIABETES MELLITUS WITHOUT COMPLICATION, WITHOUT LONG-TERM CURRENT USE OF INSULIN (HCC): ICD-10-CM

## 2024-09-23 DIAGNOSIS — G47.33 OSA (OBSTRUCTIVE SLEEP APNEA): ICD-10-CM

## 2024-09-23 DIAGNOSIS — R56.9 SEIZURE (HCC): ICD-10-CM

## 2024-09-23 DIAGNOSIS — Z86.711 HISTORY OF PULMONARY EMBOLUS (PE): ICD-10-CM

## 2024-09-23 DIAGNOSIS — M54.12 CERVICAL RADICULOPATHY: ICD-10-CM

## 2024-09-23 DIAGNOSIS — E78.00 PURE HYPERCHOLESTEROLEMIA: ICD-10-CM

## 2024-09-23 DIAGNOSIS — R56.9 SEIZURE (HCC): Primary | ICD-10-CM

## 2024-09-23 PROBLEM — D69.6 THROMBOCYTOPENIA, UNSPECIFIED (HCC): Status: RESOLVED | Noted: 2022-03-10 | Resolved: 2024-09-23

## 2024-09-23 PROBLEM — G45.9 TIA (TRANSIENT ISCHEMIC ATTACK): Status: RESOLVED | Noted: 2024-09-08 | Resolved: 2024-09-23

## 2024-09-23 PROCEDURE — 99214 OFFICE O/P EST MOD 30 MIN: CPT | Performed by: FAMILY MEDICINE

## 2024-09-23 PROCEDURE — 1123F ACP DISCUSS/DSCN MKR DOCD: CPT | Performed by: FAMILY MEDICINE

## 2024-09-23 ASSESSMENT — ENCOUNTER SYMPTOMS
ALLERGIC/IMMUNOLOGIC NEGATIVE: 1
RESPIRATORY NEGATIVE: 1
GASTROINTESTINAL NEGATIVE: 1
EYES NEGATIVE: 1

## 2024-09-25 ENCOUNTER — CARE COORDINATION (OUTPATIENT)
Dept: CARE COORDINATION | Age: 79
End: 2024-09-25

## 2024-10-15 ENCOUNTER — OFFICE VISIT (OUTPATIENT)
Dept: PRIMARY CARE CLINIC | Age: 79
End: 2024-10-15

## 2024-10-15 ENCOUNTER — OFFICE VISIT (OUTPATIENT)
Dept: PRIMARY CARE CLINIC | Age: 79
End: 2024-10-15
Payer: MEDICARE

## 2024-10-15 VITALS
WEIGHT: 185 LBS | HEART RATE: 79 BPM | TEMPERATURE: 97.8 F | OXYGEN SATURATION: 96 % | SYSTOLIC BLOOD PRESSURE: 110 MMHG | BODY MASS INDEX: 28.04 KG/M2 | DIASTOLIC BLOOD PRESSURE: 60 MMHG | DIASTOLIC BLOOD PRESSURE: 60 MMHG | HEIGHT: 68 IN | OXYGEN SATURATION: 96 % | TEMPERATURE: 97.8 F | WEIGHT: 185 LBS | HEART RATE: 79 BPM | SYSTOLIC BLOOD PRESSURE: 110 MMHG | HEIGHT: 68 IN | BODY MASS INDEX: 28.04 KG/M2

## 2024-10-15 DIAGNOSIS — R73.01 IMPAIRED FASTING GLUCOSE: ICD-10-CM

## 2024-10-15 DIAGNOSIS — Z00.00 MEDICARE ANNUAL WELLNESS VISIT, SUBSEQUENT: Primary | ICD-10-CM

## 2024-10-15 DIAGNOSIS — R56.9 SEIZURE (HCC): Primary | ICD-10-CM

## 2024-10-15 DIAGNOSIS — E78.00 PURE HYPERCHOLESTEROLEMIA: ICD-10-CM

## 2024-10-15 PROCEDURE — 99214 OFFICE O/P EST MOD 30 MIN: CPT | Performed by: FAMILY MEDICINE

## 2024-10-15 PROCEDURE — 1123F ACP DISCUSS/DSCN MKR DOCD: CPT | Performed by: FAMILY MEDICINE

## 2024-10-15 RX ORDER — RESPIRATORY SYNCYTIAL VISUS VACCINE RECOMBINANT, ADJUVANTED 120MCG/0.5
0.5 KIT INTRAMUSCULAR ONCE
Qty: 0.5 ML | Refills: 0 | Status: SHIPPED | OUTPATIENT
Start: 2024-10-15 | End: 2024-10-15

## 2024-10-15 RX ORDER — LEVETIRACETAM 500 MG/1
TABLET ORAL
Qty: 60 TABLET | Refills: 2 | Status: SHIPPED | OUTPATIENT
Start: 2024-10-15

## 2024-10-15 ASSESSMENT — PATIENT HEALTH QUESTIONNAIRE - PHQ9
SUM OF ALL RESPONSES TO PHQ9 QUESTIONS 1 & 2: 0
1. LITTLE INTEREST OR PLEASURE IN DOING THINGS: NOT AT ALL
SUM OF ALL RESPONSES TO PHQ QUESTIONS 1-9: 0
2. FEELING DOWN, DEPRESSED OR HOPELESS: NOT AT ALL

## 2024-10-15 ASSESSMENT — ENCOUNTER SYMPTOMS
ALLERGIC/IMMUNOLOGIC NEGATIVE: 1
GASTROINTESTINAL NEGATIVE: 1
EYES NEGATIVE: 1
RESPIRATORY NEGATIVE: 1

## 2024-10-15 ASSESSMENT — LIFESTYLE VARIABLES
HOW OFTEN DO YOU HAVE A DRINK CONTAINING ALCOHOL: MONTHLY OR LESS
HOW MANY STANDARD DRINKS CONTAINING ALCOHOL DO YOU HAVE ON A TYPICAL DAY: 1 OR 2

## 2024-10-15 NOTE — PROGRESS NOTES
Medicare Annual Wellness Visit    Kevin Mckeon is here for Medicare AWV    Assessment & Plan   Medicare annual wellness visit, subsequent  -     respiratory syncytial vaccine, adjuvanted (AREXVY) 120 MCG/0.5ML injection; Inject 0.5 mLs into the muscle once for 1 dose, Disp-0.5 mL, R-0Print    Recommendations for Preventive Services Due: see orders and patient instructions/AVS.  Recommended screening schedule for the next 5-10 years is provided to the patient in written form: see Patient Instructions/AVS.     Return for Medicare Annual Wellness Visit in 1 year.     Subjective       Patient's complete Health Risk Assessment and screening values have been reviewed and are found in Flowsheets. The following problems were reviewed today and where indicated follow up appointments were made and/or referrals ordered.    Positive Risk Factor Screenings with Interventions:    Fall Risk:  Do you feel unsteady or are you worried about falling? : (!) yes  2 or more falls in past year?: no  Fall with injury in past year?: no     Interventions:    Reviewed medications, home hazards, visual acuity, and co-morbidities that can increase risk for falls  Safety measures at home             Inactivity:  On average, how many days per week do you engage in moderate to strenuous exercise (like a brisk walk)?: 0 days (!) Abnormal  On average, how many minutes do you engage in exercise at this level?: 0 min  Interventions:  As tolerated                         Objective   Vitals:    10/15/24 1022   BP: 110/60   Pulse: 79   Temp: 97.8 °F (36.6 °C)   SpO2: 96%   Weight: 83.9 kg (185 lb)   Height: 1.727 m (5' 8\")      Body mass index is 28.13 kg/m².                  Allergies   Allergen Reactions    Atorvastatin      Takes pravastatin    Flomax [Tamsulosin Hcl]     Metformin And Related Other (See Comments)     Loss of memory    Penicillins     Silodosin      Prior to Visit Medications    Medication Sig Taking? Authorizing Provider

## 2024-10-15 NOTE — PATIENT INSTRUCTIONS
questions about a medical condition or this instruction, always ask your healthcare professional. [employer], [employer] disclaims any warranty or liability for your use of this information.      Personalized Preventive Plan for Kevin Mckeon - 10/15/2024  Medicare offers a range of preventive health benefits. Some of the tests and screenings are paid in full while other may be subject to a deductible, co-insurance, and/or copay.    Some of these benefits include a comprehensive review of your medical history including lifestyle, illnesses that may run in your family, and various assessments and screenings as appropriate.    After reviewing your medical record and screening and assessments performed today your provider may have ordered immunizations, labs, imaging, and/or referrals for you.  A list of these orders (if applicable) as well as your Preventive Care list are included within your After Visit Summary for your review.    Other Preventive Recommendations:    A preventive eye exam performed by an eye specialist is recommended every 1-2 years to screen for glaucoma; cataracts, macular degeneration, and other eye disorders.  A preventive dental visit is recommended every 6 months.  Try to get at least 150 minutes of exercise per week or 10,000 steps per day on a pedometer .  Order or download the FREE \"Exercise & Physical Activity: Your Everyday Guide\" from [employer]. Call 1-827.901.3114 or search [employer] online.  You need 6289-3005 mg of calcium and 9559-1730 IU of vitamin D per day. It is possible to meet your calcium requirement with diet alone, but a vitamin D supplement is usually necessary to meet this goal.  When exposed to the sun, use a sunscreen that protects against both UVA and UVB radiation with an SPF of 30 or greater. Reapply every 2 to 3 hours or after sweating, drying off with a towel, or swimming.  Always wear a seat belt when traveling in a car.

## 2024-10-15 NOTE — PROGRESS NOTES
10/15/24     Kevin Mckeon    : 1945 Sex: male   Age: 78 y.o.      Chief Complaint   Patient presents with    Seizures    Discuss Medications       Prior to Admission medications    Medication Sig Start Date End Date Taking? Authorizing Provider   levETIRAcetam (KEPPRA) 500 MG tablet 1 q12 10/15/24  Yes Lokesh Fernandez DO   respiratory syncytial vaccine, adjuvanted (AREXVY) 120 MCG/0.5ML injection Inject 0.5 mLs into the muscle once for 1 dose 10/15/24 10/15/24  Lokesh Fernandez DO   fluticasone (FLONASE) 50 MCG/ACT nasal spray 2 sprays by Each Nostril route daily    Crista Rivero MD   pravastatin (PRAVACHOL) 40 MG tablet Take 1 tablet by mouth daily    Crista Rivero MD   montelukast (SINGULAIR) 10 MG tablet Take 1 tablet by mouth nightly 24   Lokesh Fernandez DO   famotidine (PEPCID) 20 MG tablet Take 1 tablet by mouth daily 24   Lokesh Fernandez DO   vitamin E 400 UNIT capsule Take 1 capsule by mouth daily    Crista Rivero MD   aspirin 81 MG tablet Take 1 tablet by mouth daily    Crista Rivero MD   Coenzyme Q10 (CO Q-10) 200 MG CAPS Take 1 capsule by mouth daily    Crista Rivero MD   Multiple Vitamins-Minerals (THERAPEUTIC MULTIVITAMIN-MINERALS) tablet Take 1 tablet by mouth daily    Crista Rivero MD   Ascorbic Acid (VITAMIN C) 250 MG tablet Take 2 tablets by mouth daily    ProviderCrista MD Saw Palmetto Serenoa repens, 1000 MG CAPS Take 1,000 mg by mouth daily     ProviderCrista MD          HPI: Patient evaluated today issues of seizures hyperlipidemia.  Fasting glucose all been very stable.  Medications reviewed we will maintain as prescribed.  Diet and exercise for blood sugar and labs will be completed with next visit.  He has had trouble with feeling lightheaded on Keppra medication at 1000 mg twice a day and we are going to back off to 500 twice a day and then reassess next month.  Difficulties with medication

## 2024-11-12 DIAGNOSIS — E78.00 PURE HYPERCHOLESTEROLEMIA: ICD-10-CM

## 2024-11-12 DIAGNOSIS — R73.01 IMPAIRED FASTING GLUCOSE: ICD-10-CM

## 2024-11-12 DIAGNOSIS — R56.9 SEIZURE (HCC): ICD-10-CM

## 2024-11-12 LAB
ALBUMIN: 4.1 G/DL
ALP BLD-CCNC: 47 U/L
ALT SERPL-CCNC: 21 U/L
ANION GAP SERPL CALCULATED.3IONS-SCNC: ABNORMAL MMOL/L
AST SERPL-CCNC: 21 U/L
BASOPHILS ABSOLUTE: 48 /ΜL
BASOPHILS RELATIVE PERCENT: 1 %
BILIRUB SERPL-MCNC: 0.8 MG/DL (ref 0.1–1.4)
BUN BLDV-MCNC: 16 MG/DL
CALCIUM SERPL-MCNC: 9 MG/DL
CHLORIDE BLD-SCNC: 103 MMOL/L
CHOLESTEROL, TOTAL: 193 MG/DL
CHOLESTEROL/HDL RATIO: 3.6
CO2: 27 MMOL/L
CREAT SERPL-MCNC: 0.88 MG/DL
EOSINOPHILS ABSOLUTE: 221 /ΜL
EOSINOPHILS RELATIVE PERCENT: 4.6 %
ESTIMATED AVERAGE GLUCOSE: ABNORMAL
GFR, ESTIMATED: 89
GLUCOSE BLD-MCNC: 111 MG/DL
HBA1C MFR BLD: 6 %
HCT VFR BLD CALC: 43.3 % (ref 41–53)
HDLC SERPL-MCNC: 54 MG/DL (ref 35–70)
HEMOGLOBIN: 14.8 G/DL (ref 13.5–17.5)
LDL CHOLESTEROL: 111
LYMPHOCYTES ABSOLUTE: 2366 /ΜL
LYMPHOCYTES RELATIVE PERCENT: 49.3 %
MCH RBC QN AUTO: 32.5 PG
MCHC RBC AUTO-ENTMCNC: 34.2 G/DL
MCV RBC AUTO: 95.2 FL
MONOCYTES ABSOLUTE: 586 /ΜL
MONOCYTES RELATIVE PERCENT: 12.2 %
NEUTROPHILS ABSOLUTE: 1579 /ΜL
NEUTROPHILS RELATIVE PERCENT: 32.9 %
NONHDLC SERPL-MCNC: 139 MG/DL
PDW BLD-RTO: 12 %
PLATELET # BLD: NORMAL 10*3/UL
PMV BLD AUTO: NORMAL FL
POTASSIUM SERPL-SCNC: 4.6 MMOL/L
RBC # BLD: 4.55 10^6/ΜL
SODIUM BLD-SCNC: 139 MMOL/L
T4 TOTAL: 5.3
TOTAL PROTEIN: 6.6 G/DL (ref 6.4–8.2)
TRIGL SERPL-MCNC: 164 MG/DL
TSH SERPL DL<=0.05 MIU/L-ACNC: 1.69 UIU/ML
VLDLC SERPL CALC-MCNC: ABNORMAL MG/DL
WBC # BLD: 4.8 10^3/ML

## 2024-11-15 ENCOUNTER — OFFICE VISIT (OUTPATIENT)
Dept: PRIMARY CARE CLINIC | Age: 79
End: 2024-11-15

## 2024-11-15 VITALS
WEIGHT: 191 LBS | HEART RATE: 87 BPM | SYSTOLIC BLOOD PRESSURE: 120 MMHG | HEIGHT: 68 IN | OXYGEN SATURATION: 96 % | DIASTOLIC BLOOD PRESSURE: 80 MMHG | BODY MASS INDEX: 28.95 KG/M2 | TEMPERATURE: 98 F

## 2024-11-15 DIAGNOSIS — Z86.711 HISTORY OF PULMONARY EMBOLUS (PE): ICD-10-CM

## 2024-11-15 DIAGNOSIS — R56.9 SEIZURE (HCC): Primary | ICD-10-CM

## 2024-11-15 DIAGNOSIS — E78.00 PURE HYPERCHOLESTEROLEMIA: ICD-10-CM

## 2024-11-15 DIAGNOSIS — R73.01 IMPAIRED FASTING GLUCOSE: ICD-10-CM

## 2024-11-15 DIAGNOSIS — E11.9 TYPE 2 DIABETES MELLITUS WITHOUT COMPLICATION, WITHOUT LONG-TERM CURRENT USE OF INSULIN (HCC): ICD-10-CM

## 2024-11-15 RX ORDER — LEVETIRACETAM 500 MG/1
TABLET ORAL
Qty: 180 TABLET | Refills: 1 | Status: SHIPPED | OUTPATIENT
Start: 2024-11-15

## 2024-11-15 NOTE — PROGRESS NOTES
09/08/2024    VLDL 149 05/09/2019     Lab Results   Component Value Date    CHOLHDLRATIO 3.6 11/12/2024    CHOLHDLRATIO 3.3 03/27/2024     Lab Results   Component Value Date    WBC 4.8 11/12/2024    HGB 14.8 11/12/2024    HCT 43.3 11/12/2024    MCV 95.2 11/12/2024     09/19/2024    LYMPHOPCT 49.3 11/12/2024    RBC 4.55 11/12/2024    MCH 32.5 11/12/2024    MCHC 34.2 11/12/2024    RDW 12.0 11/12/2024     Lab Results   Component Value Date     11/12/2024    K 4.6 11/12/2024     11/12/2024    CO2 27 11/12/2024    BUN 16 11/12/2024    CREATININE 0.88 11/12/2024    GLUCOSE 111 (H) 11/12/2024    CALCIUM 9.0 11/12/2024    BILITOT 0.8 11/12/2024    ALKPHOS 47 11/12/2024    AST 21 11/12/2024    ALT 21 11/12/2024    LABGLOM 89 11/12/2024    GFRAA >60 09/21/2016        Lab Results   Component Value Date    PSA 1.00 09/09/2024    PSA 1.05 09/20/2023    PSA 0.91 03/04/2022      Lab Results   Component Value Date    LABA1C 6.0 (H) 11/12/2024    LABA1C 5.9 (H) 09/09/2024    LABA1C 5.8 (H) 09/07/2024     No results found for: \"EAG\"            Plan Per Assessment:  Kevin was seen today for seizures.    Diagnoses and all orders for this visit:    Seizure (HCC)    History of pulmonary embolus (PE)    Type 2 diabetes mellitus without complication, without long-term current use of insulin (HCC)    Other orders  -     levETIRAcetam (KEPPRA) 500 MG tablet; 1 q12            No follow-ups on file.      Lokesh Fernandez,     Note was generated with the assistance of voice recognition software.  Document was reviewed however may contain grammatical errors.

## 2025-01-23 RX ORDER — FAMOTIDINE 20 MG/1
20 TABLET, FILM COATED ORAL DAILY
Qty: 90 TABLET | Refills: 3 | Status: SHIPPED | OUTPATIENT
Start: 2025-01-23

## 2025-01-23 RX ORDER — PRAVASTATIN SODIUM 40 MG
40 TABLET ORAL DAILY
Qty: 90 TABLET | Refills: 3 | Status: SHIPPED | OUTPATIENT
Start: 2025-01-23

## 2025-01-23 RX ORDER — MONTELUKAST SODIUM 10 MG/1
10 TABLET ORAL NIGHTLY
Qty: 90 TABLET | Refills: 3 | Status: SHIPPED | OUTPATIENT
Start: 2025-01-23

## 2025-02-07 LAB
ALBUMIN: 4.5 G/DL
ALP BLD-CCNC: 56 U/L
ALT SERPL-CCNC: 45 U/L
ANION GAP SERPL CALCULATED.3IONS-SCNC: ABNORMAL MMOL/L
AST SERPL-CCNC: 29 U/L
BILIRUB SERPL-MCNC: 1.1 MG/DL (ref 0.1–1.4)
BUN BLDV-MCNC: 21 MG/DL
CALCIUM SERPL-MCNC: 9.3 MG/DL
CHLORIDE BLD-SCNC: 102 MMOL/L
CHOLESTEROL, TOTAL: 233 MG/DL
CHOLESTEROL/HDL RATIO: 3.4
CO2: 30 MMOL/L
CREAT SERPL-MCNC: 0.83 MG/DL
ESTIMATED AVERAGE GLUCOSE: ABNORMAL
GFR, ESTIMATED: 90
GLUCOSE BLD-MCNC: 107 MG/DL
HBA1C MFR BLD: 6.1 %
HDLC SERPL-MCNC: 69 MG/DL (ref 35–70)
LDL CHOLESTEROL: 134
NONHDLC SERPL-MCNC: 164 MG/DL
POTASSIUM SERPL-SCNC: 3.9 MMOL/L
SODIUM BLD-SCNC: 140 MMOL/L
TOTAL CK: 31 U/L
TOTAL PROTEIN: 7.1 G/DL (ref 6.4–8.2)
TRIGL SERPL-MCNC: 162 MG/DL
VLDLC SERPL CALC-MCNC: ABNORMAL MG/DL

## 2025-02-12 ASSESSMENT — PATIENT HEALTH QUESTIONNAIRE - PHQ9
SUM OF ALL RESPONSES TO PHQ QUESTIONS 1-9: 0
1. LITTLE INTEREST OR PLEASURE IN DOING THINGS: NOT AT ALL
SUM OF ALL RESPONSES TO PHQ9 QUESTIONS 1 & 2: 0
SUM OF ALL RESPONSES TO PHQ QUESTIONS 1-9: 0
2. FEELING DOWN, DEPRESSED OR HOPELESS: NOT AT ALL
SUM OF ALL RESPONSES TO PHQ QUESTIONS 1-9: 0
SUM OF ALL RESPONSES TO PHQ QUESTIONS 1-9: 0
1. LITTLE INTEREST OR PLEASURE IN DOING THINGS: NOT AT ALL
SUM OF ALL RESPONSES TO PHQ9 QUESTIONS 1 & 2: 0
2. FEELING DOWN, DEPRESSED OR HOPELESS: NOT AT ALL

## 2025-02-13 ENCOUNTER — OFFICE VISIT (OUTPATIENT)
Dept: PRIMARY CARE CLINIC | Age: 80
End: 2025-02-13

## 2025-02-13 VITALS
HEIGHT: 68 IN | TEMPERATURE: 98 F | SYSTOLIC BLOOD PRESSURE: 132 MMHG | BODY MASS INDEX: 29.1 KG/M2 | OXYGEN SATURATION: 98 % | DIASTOLIC BLOOD PRESSURE: 80 MMHG | WEIGHT: 192 LBS | HEART RATE: 81 BPM

## 2025-02-13 DIAGNOSIS — R56.9 SEIZURE (HCC): ICD-10-CM

## 2025-02-13 DIAGNOSIS — K21.9 GASTROESOPHAGEAL REFLUX DISEASE WITHOUT ESOPHAGITIS: Chronic | ICD-10-CM

## 2025-02-13 DIAGNOSIS — E11.9 TYPE 2 DIABETES MELLITUS WITHOUT COMPLICATION, WITHOUT LONG-TERM CURRENT USE OF INSULIN (HCC): Primary | ICD-10-CM

## 2025-02-13 DIAGNOSIS — E11.9 TYPE 2 DIABETES MELLITUS WITHOUT COMPLICATION, WITHOUT LONG-TERM CURRENT USE OF INSULIN (HCC): ICD-10-CM

## 2025-02-13 DIAGNOSIS — E78.00 PURE HYPERCHOLESTEROLEMIA: ICD-10-CM

## 2025-02-13 DIAGNOSIS — E78.00 PURE HYPERCHOLESTEROLEMIA: Chronic | ICD-10-CM

## 2025-02-13 DIAGNOSIS — M48.062 SPINAL STENOSIS OF LUMBAR REGION WITH NEUROGENIC CLAUDICATION: ICD-10-CM

## 2025-02-13 RX ORDER — LEVETIRACETAM 500 MG/1
TABLET ORAL
Qty: 180 TABLET | Refills: 1 | Status: SHIPPED | OUTPATIENT
Start: 2025-02-13

## 2025-02-13 ASSESSMENT — ENCOUNTER SYMPTOMS
EYES NEGATIVE: 1
ALLERGIC/IMMUNOLOGIC NEGATIVE: 1
RESPIRATORY NEGATIVE: 1
GASTROINTESTINAL NEGATIVE: 1

## 2025-02-13 NOTE — PROGRESS NOTES
25     Kevin Mckeon    : 1945 Sex: male   Age: 79 y.o.      Chief Complaint   Patient presents with    Seizures    Medication Refill       Prior to Admission medications    Medication Sig Start Date End Date Taking? Authorizing Provider   levETIRAcetam (KEPPRA) 500 MG tablet 1 q12 25  Yes Lokesh Fernandez DO   pravastatin (PRAVACHOL) 40 MG tablet TAKE 1 TABLET DAILY 25   Lokesh Fernandez DO   montelukast (SINGULAIR) 10 MG tablet TAKE 1 TABLET NIGHTLY 25   Lokesh Fernandez DO   famotidine (PEPCID) 20 MG tablet TAKE 1 TABLET DAILY 25   Lokesh Fernandez DO   fluticasone (FLONASE) 50 MCG/ACT nasal spray 2 sprays by Each Nostril route daily    Crista Rievro MD   vitamin E 400 UNIT capsule Take 1 capsule by mouth daily    Crista Rivero MD   aspirin 81 MG tablet Take 1 tablet by mouth daily    Crista Rivero MD   Coenzyme Q10 (CO Q-10) 200 MG CAPS Take 1 capsule by mouth daily    Crista Rivero MD   Multiple Vitamins-Minerals (THERAPEUTIC MULTIVITAMIN-MINERALS) tablet Take 1 tablet by mouth daily    Crista Rivero MD   Ascorbic Acid (VITAMIN C) 250 MG tablet Take 2 tablets by mouth daily    Provider, Historical, MD   Saw Palmetto, Serenoa repens, 1000 MG CAPS Take 1,000 mg by mouth daily     Crista Rivero MD          HPI: Patient evaluated today diabetes seizures hyperlipidemia history of spinal stenosis.  Recent problems with his back and is going to start physical therapy which I believe will be helpful and then we will follow-up if any persistent problems.  Also following with orthopedics.  Seizures have been well-controlled continues on the Keppra 500 twice a day and will maintain.  He is going to follow-up with neurology.  Hyperlipidemia controlled.  Meds to continue as prescribed.          Review of Systems   Constitutional: Negative.    HENT: Negative.     Eyes: Negative.    Respiratory: Negative.     Gastrointestinal:

## 2025-02-20 DIAGNOSIS — E78.00 PURE HYPERCHOLESTEROLEMIA: ICD-10-CM

## 2025-02-20 DIAGNOSIS — E11.9 TYPE 2 DIABETES MELLITUS WITHOUT COMPLICATION, WITHOUT LONG-TERM CURRENT USE OF INSULIN (HCC): ICD-10-CM

## 2025-05-08 LAB
ESTIMATED AVERAGE GLUCOSE: ABNORMAL
HBA1C MFR BLD: 6 %

## 2025-05-09 LAB
ALBUMIN: 4.6 G/DL
ALP BLD-CCNC: 43 U/L
ALT SERPL-CCNC: 28 U/L
ANION GAP SERPL CALCULATED.3IONS-SCNC: NORMAL MMOL/L
AST SERPL-CCNC: 22 U/L
BASOPHILS ABSOLUTE: 51 /ΜL
BASOPHILS RELATIVE PERCENT: 1 %
BILIRUB SERPL-MCNC: 0.8 MG/DL (ref 0.1–1.4)
BUN BLDV-MCNC: 19 MG/DL
CALCIUM SERPL-MCNC: 9.2 MG/DL
CHLORIDE BLD-SCNC: 105 MMOL/L
CHOLESTEROL, TOTAL: 217 MG/DL
CHOLESTEROL/HDL RATIO: 3.8
CO2: 28 MMOL/L
CREAT SERPL-MCNC: 0.86 MG/DL
EOSINOPHILS ABSOLUTE: 179 /ΜL
EOSINOPHILS RELATIVE PERCENT: 3.5 %
GFR, ESTIMATED: 89
GLUCOSE BLD-MCNC: 119 MG/DL
HCT VFR BLD CALC: 45.2 % (ref 41–53)
HDLC SERPL-MCNC: 57 MG/DL (ref 35–70)
HEMOGLOBIN: 15.7 G/DL (ref 13.5–17.5)
LDL CHOLESTEROL: 124
LYMPHOCYTES ABSOLUTE: 2423 /ΜL
LYMPHOCYTES RELATIVE PERCENT: 47.5 %
MCH RBC QN AUTO: 33.5 PG
MCHC RBC AUTO-ENTMCNC: 34.7 G/DL
MCV RBC AUTO: 96.4 FL
MONOCYTES ABSOLUTE: 551 /ΜL
MONOCYTES RELATIVE PERCENT: 10.8 %
NEUTROPHILS ABSOLUTE: 1897 /ΜL
NEUTROPHILS RELATIVE PERCENT: 37.2 %
NONHDLC SERPL-MCNC: 160 MG/DL
PDW BLD-RTO: 12.7 %
PLATELET # BLD: NORMAL 10*3/UL
PMV BLD AUTO: NORMAL FL
POTASSIUM SERPL-SCNC: 4.4 MMOL/L
RBC # BLD: 4.69 10^6/ΜL
SODIUM BLD-SCNC: 142 MMOL/L
T4 TOTAL: 5.6
TOTAL PROTEIN: 6.9 G/DL (ref 6.4–8.2)
TRIGL SERPL-MCNC: 223 MG/DL
TSH SERPL DL<=0.05 MIU/L-ACNC: 1.33 UIU/ML
VLDLC SERPL CALC-MCNC: ABNORMAL MG/DL
WBC # BLD: 5.1 10^3/ML

## 2025-05-13 ENCOUNTER — OFFICE VISIT (OUTPATIENT)
Dept: PRIMARY CARE CLINIC | Age: 80
End: 2025-05-13
Payer: MEDICARE

## 2025-05-13 ENCOUNTER — OFFICE VISIT (OUTPATIENT)
Dept: PRIMARY CARE CLINIC | Age: 80
End: 2025-05-13

## 2025-05-13 VITALS
DIASTOLIC BLOOD PRESSURE: 80 MMHG | SYSTOLIC BLOOD PRESSURE: 130 MMHG | HEART RATE: 73 BPM | BODY MASS INDEX: 29.55 KG/M2 | OXYGEN SATURATION: 95 % | WEIGHT: 195 LBS | TEMPERATURE: 98.2 F | HEIGHT: 68 IN

## 2025-05-13 VITALS
DIASTOLIC BLOOD PRESSURE: 80 MMHG | BODY MASS INDEX: 29.55 KG/M2 | HEIGHT: 68 IN | WEIGHT: 195 LBS | TEMPERATURE: 98.2 F | HEART RATE: 73 BPM | SYSTOLIC BLOOD PRESSURE: 130 MMHG | OXYGEN SATURATION: 98 %

## 2025-05-13 DIAGNOSIS — Z86.711 HISTORY OF PULMONARY EMBOLUS (PE): ICD-10-CM

## 2025-05-13 DIAGNOSIS — Z00.00 MEDICARE ANNUAL WELLNESS VISIT, SUBSEQUENT: Primary | ICD-10-CM

## 2025-05-13 DIAGNOSIS — E78.00 PURE HYPERCHOLESTEROLEMIA: ICD-10-CM

## 2025-05-13 DIAGNOSIS — R73.01 IMPAIRED FASTING GLUCOSE: ICD-10-CM

## 2025-05-13 DIAGNOSIS — E11.9 TYPE 2 DIABETES MELLITUS WITHOUT COMPLICATION, WITHOUT LONG-TERM CURRENT USE OF INSULIN (HCC): Primary | ICD-10-CM

## 2025-05-13 DIAGNOSIS — Z12.5 PROSTATE CANCER SCREENING: ICD-10-CM

## 2025-05-13 DIAGNOSIS — E11.9 TYPE 2 DIABETES MELLITUS WITHOUT COMPLICATION, WITHOUT LONG-TERM CURRENT USE OF INSULIN (HCC): ICD-10-CM

## 2025-05-13 DIAGNOSIS — J30.1 SEASONAL ALLERGIC RHINITIS DUE TO POLLEN: ICD-10-CM

## 2025-05-13 DIAGNOSIS — R56.9 SEIZURE (HCC): ICD-10-CM

## 2025-05-13 PROCEDURE — 99214 OFFICE O/P EST MOD 30 MIN: CPT | Performed by: FAMILY MEDICINE

## 2025-05-13 PROCEDURE — 3044F HG A1C LEVEL LT 7.0%: CPT | Performed by: FAMILY MEDICINE

## 2025-05-13 ASSESSMENT — PATIENT HEALTH QUESTIONNAIRE - PHQ9
SUM OF ALL RESPONSES TO PHQ QUESTIONS 1-9: 0
1. LITTLE INTEREST OR PLEASURE IN DOING THINGS: NOT AT ALL
SUM OF ALL RESPONSES TO PHQ QUESTIONS 1-9: 0
2. FEELING DOWN, DEPRESSED OR HOPELESS: NOT AT ALL

## 2025-05-13 ASSESSMENT — LIFESTYLE VARIABLES
HOW OFTEN DO YOU HAVE A DRINK CONTAINING ALCOHOL: 2-4 TIMES A MONTH
HOW MANY STANDARD DRINKS CONTAINING ALCOHOL DO YOU HAVE ON A TYPICAL DAY: 1 OR 2

## 2025-05-13 NOTE — PROGRESS NOTES
25     Kevin Mckeon    : 1945 Sex: male   Age: 79 y.o.      Chief Complaint   Patient presents with    Diabetes       Prior to Admission medications    Medication Sig Start Date End Date Taking? Authorizing Provider   levETIRAcetam (KEPPRA) 500 MG tablet 1 q12 25  Yes Lokesh Fernandez DO   pravastatin (PRAVACHOL) 40 MG tablet TAKE 1 TABLET DAILY 25  Yes Lokesh Fernandez DO   montelukast (SINGULAIR) 10 MG tablet TAKE 1 TABLET NIGHTLY 25  Yes Lokesh Fernandez DO   famotidine (PEPCID) 20 MG tablet TAKE 1 TABLET DAILY 25  Yes Lokesh Fernandez DO   fluticasone (FLONASE) 50 MCG/ACT nasal spray 2 sprays by Each Nostril route daily as needed   Yes Crista Rivero MD   vitamin E 400 UNIT capsule Take 1 capsule by mouth daily   Yes Crista Rivero MD   aspirin 81 MG tablet Take 1 tablet by mouth daily   Yes Crista Rivero MD   Coenzyme Q10 (CO Q-10) 200 MG CAPS Take 1 capsule by mouth daily   Yes Crista Rivero MD   Multiple Vitamins-Minerals (THERAPEUTIC MULTIVITAMIN-MINERALS) tablet Take 1 tablet by mouth daily   Yes Crista Riveor MD   Ascorbic Acid (VITAMIN C) 250 MG tablet Take 2 tablets by mouth daily   Yes Provider, Historical, MD   Saw Palmetto, Serenoa repens, 1000 MG CAPS Take 1,000 mg by mouth daily    Yes Crista Rivero MD          HPI: Patient evaluated today multiple problems diabetes seizure activity hyperlipidemia history of pulmonary embolus in the past impaired fasting glucose seasonal allergies.  Overall medically doing very well at this time.  He was hospitalized little over a year ago with seizure activity and has been on Keppra with good control at a dose of 500 mg twice a day.  Overall clinically remains well feels well.  I am going to have him see neurology for an evaluation and recommendations on continued medication.  He would like to consider coming off.  Clinically otherwise is stable maintain current meds and

## 2025-05-13 NOTE — PROGRESS NOTES
Medicare Annual Wellness Visit    Kevin Mckeon is here for Medicare AWV    Assessment & Plan   Medicare annual wellness visit, subsequent       Return for Medicare Annual Wellness Visit in 1 year.     Subjective       Patient's complete Health Risk Assessment and screening values have been reviewed and are found in Flowsheets. The following problems were reviewed today and where indicated follow up appointments were made and/or referrals ordered.    Positive Risk Factor Screenings with Interventions:              Inactivity:  On average, how many days per week do you engage in moderate to strenuous exercise (like a brisk walk)?: 0 days (!) Abnormal  On average, how many minutes do you engage in exercise at this level?: 0 min  Interventions:  As tolerated.                         Objective   Vitals:    05/13/25 1021   BP: 130/80   Pulse: 73   Temp: 98.2 °F (36.8 °C)   SpO2: 98%   Weight: 88.5 kg (195 lb)   Height: 1.727 m (5' 8\")      Body mass index is 29.65 kg/m².                  Allergies   Allergen Reactions    Atorvastatin      Takes pravastatin    Flomax [Tamsulosin Hcl]     Metformin And Related Other (See Comments)     Loss of memory    Penicillins     Silodosin      Prior to Visit Medications    Medication Sig Taking? Authorizing Provider   levETIRAcetam (KEPPRA) 500 MG tablet 1 q12 Yes Lokesh Fernandez, DO   pravastatin (PRAVACHOL) 40 MG tablet TAKE 1 TABLET DAILY Yes Lokesh Fernandez, DO   montelukast (SINGULAIR) 10 MG tablet TAKE 1 TABLET NIGHTLY Yes Lokesh Fernandez, DO   famotidine (PEPCID) 20 MG tablet TAKE 1 TABLET DAILY Yes Lokesh Fernandez, DO   fluticasone (FLONASE) 50 MCG/ACT nasal spray 2 sprays by Each Nostril route daily as needed Yes Crista Rivero MD   vitamin E 400 UNIT capsule Take 1 capsule by mouth daily Yes Crista Rivero MD   aspirin 81 MG tablet Take 1 tablet by mouth daily Yes Crista Rivero MD   Coenzyme Q10 (CO Q-10) 200 MG CAPS Take 1 capsule by mouth

## 2025-05-15 RX ORDER — FLUTICASONE PROPIONATE 50 MCG
2 SPRAY, SUSPENSION (ML) NASAL DAILY
Qty: 48 G | Refills: 3 | Status: SHIPPED | OUTPATIENT
Start: 2025-05-15

## 2025-06-20 ENCOUNTER — TELEPHONE (OUTPATIENT)
Age: 80
End: 2025-06-20

## 2025-06-20 NOTE — TELEPHONE ENCOUNTER
Called pt to discuss referral sent to [facility]-pt spouse reports they want to \"see a real doctor not just an assistant\" educated spouse that Jewel Santos is an NP and would have to reach out to [facility] directly for second opinion.     Referral was routed from [facility] to [facility] office.